# Patient Record
Sex: MALE | Race: WHITE | Employment: FULL TIME | URBAN - METROPOLITAN AREA
[De-identification: names, ages, dates, MRNs, and addresses within clinical notes are randomized per-mention and may not be internally consistent; named-entity substitution may affect disease eponyms.]

---

## 2019-01-07 PROBLEM — E05.00 GRAVES' DISEASE: Status: ACTIVE | Noted: 2019-01-07

## 2019-01-07 PROBLEM — N62 GYNECOMASTIA: Status: ACTIVE | Noted: 2019-01-07

## 2019-01-07 PROBLEM — R00.2 PALPITATIONS: Status: ACTIVE | Noted: 2019-01-07

## 2019-01-25 ENCOUNTER — HOSPITAL ENCOUNTER (INPATIENT)
Age: 44
LOS: 4 days | Discharge: HOME | DRG: 645 | End: 2019-01-29
Attending: EMERGENCY MEDICINE | Admitting: INTERNAL MEDICINE
Payer: COMMERCIAL

## 2019-01-25 ENCOUNTER — APPOINTMENT (OUTPATIENT)
Dept: GENERAL RADIOLOGY | Age: 44
DRG: 645 | End: 2019-01-25
Attending: EMERGENCY MEDICINE
Payer: COMMERCIAL

## 2019-01-25 DIAGNOSIS — E05.91 THYROTOXICOSIS WITH THYROTOXIC CRISIS, UNSPECIFIED THYROTOXICOSIS TYPE: Primary | ICD-10-CM

## 2019-01-25 PROBLEM — E05.90 THYROTOXICOSIS: Status: ACTIVE | Noted: 2019-01-25

## 2019-01-25 LAB
ALBUMIN SERPL-MCNC: 3.4 G/DL (ref 3.5–5)
ALBUMIN/GLOB SERPL: 0.9 {RATIO} (ref 1.2–3.5)
ALP SERPL-CCNC: 148 U/L (ref 50–136)
ALT SERPL-CCNC: 44 U/L (ref 12–65)
AMPHET UR QL SCN: NEGATIVE
ANION GAP SERPL CALC-SCNC: 8 MMOL/L (ref 7–16)
APPEARANCE UR: CLEAR
AST SERPL-CCNC: 20 U/L (ref 15–37)
ATRIAL RATE: 141 BPM
BACTERIA URNS QL MICRO: 0 /HPF
BARBITURATES UR QL SCN: NEGATIVE
BASOPHILS # BLD: 0 K/UL (ref 0–0.2)
BASOPHILS NFR BLD: 0 % (ref 0–2)
BENZODIAZ UR QL: NEGATIVE
BILIRUB SERPL-MCNC: 1.1 MG/DL (ref 0.2–1.1)
BILIRUB UR QL: NEGATIVE
BUN SERPL-MCNC: 16 MG/DL (ref 6–23)
CALCIUM SERPL-MCNC: 9.4 MG/DL (ref 8.3–10.4)
CALCULATED P AXIS, ECG09: 73 DEGREES
CALCULATED R AXIS, ECG10: 134 DEGREES
CALCULATED T AXIS, ECG11: 60 DEGREES
CANNABINOIDS UR QL SCN: POSITIVE
CASTS URNS QL MICRO: ABNORMAL /LPF
CHLORIDE SERPL-SCNC: 101 MMOL/L (ref 98–107)
CK MB CFR SERPL CALC: NORMAL %
CK MB SERPL-MCNC: <1 NG/ML (ref 0.5–3.6)
CK SERPL-CCNC: 37 U/L (ref 21–215)
CO2 SERPL-SCNC: 24 MMOL/L (ref 21–32)
COCAINE UR QL SCN: NEGATIVE
COLOR UR: ABNORMAL
CREAT SERPL-MCNC: 0.61 MG/DL (ref 0.8–1.5)
CRP SERPL-MCNC: 18.7 MG/DL (ref 0–0.9)
DIAGNOSIS, 93000: NORMAL
DIFFERENTIAL METHOD BLD: ABNORMAL
EOSINOPHIL # BLD: 0 K/UL (ref 0–0.8)
EOSINOPHIL NFR BLD: 0 % (ref 0.5–7.8)
EPI CELLS #/AREA URNS HPF: ABNORMAL /HPF
ERYTHROCYTE [DISTWIDTH] IN BLOOD BY AUTOMATED COUNT: 12.6 % (ref 11.9–14.6)
ERYTHROCYTE [SEDIMENTATION RATE] IN BLOOD: 39 MM/HR (ref 0–15)
GLOBULIN SER CALC-MCNC: 3.8 G/DL (ref 2.3–3.5)
GLUCOSE BLD STRIP.AUTO-MCNC: 139 MG/DL (ref 65–100)
GLUCOSE SERPL-MCNC: 106 MG/DL (ref 65–100)
GLUCOSE UR STRIP.AUTO-MCNC: NEGATIVE MG/DL
HCT VFR BLD AUTO: 35.6 % (ref 41.1–50.3)
HGB BLD-MCNC: 12.4 G/DL (ref 13.6–17.2)
HGB UR QL STRIP: NEGATIVE
IMM GRANULOCYTES # BLD AUTO: 0.1 K/UL (ref 0–0.5)
IMM GRANULOCYTES NFR BLD AUTO: 1 % (ref 0–5)
KETONES UR QL STRIP.AUTO: 40 MG/DL
LACTATE BLD-SCNC: 1.53 MMOL/L (ref 0.5–1.9)
LEUKOCYTE ESTERASE UR QL STRIP.AUTO: NEGATIVE
LYMPHOCYTES # BLD: 1.7 K/UL (ref 0.5–4.6)
LYMPHOCYTES NFR BLD: 12 % (ref 13–44)
MAGNESIUM SERPL-MCNC: 2.2 MG/DL (ref 1.8–2.4)
MCH RBC QN AUTO: 29.4 PG (ref 26.1–32.9)
MCHC RBC AUTO-ENTMCNC: 34.8 G/DL (ref 31.4–35)
MCV RBC AUTO: 84.4 FL (ref 79.6–97.8)
METHADONE UR QL: NEGATIVE
MONOCYTES # BLD: 0.6 K/UL (ref 0.1–1.3)
MONOCYTES NFR BLD: 4 % (ref 4–12)
NEUTS SEG # BLD: 11.4 K/UL (ref 1.7–8.2)
NEUTS SEG NFR BLD: 83 % (ref 43–78)
NITRITE UR QL STRIP.AUTO: NEGATIVE
NRBC # BLD: 0 K/UL (ref 0–0.2)
OPIATES UR QL: NEGATIVE
P-R INTERVAL, ECG05: 112 MS
PCP UR QL: NEGATIVE
PH UR STRIP: 5.5 [PH] (ref 5–9)
PLATELET # BLD AUTO: 222 K/UL (ref 150–450)
PMV BLD AUTO: 9.7 FL (ref 9.4–12.3)
POTASSIUM SERPL-SCNC: 3.9 MMOL/L (ref 3.5–5.1)
PROT SERPL-MCNC: 7.2 G/DL (ref 6.3–8.2)
PROT UR STRIP-MCNC: 30 MG/DL
Q-T INTERVAL, ECG07: 366 MS
QRS DURATION, ECG06: 96 MS
QTC CALCULATION (BEZET), ECG08: 560 MS
RBC # BLD AUTO: 4.22 M/UL (ref 4.23–5.6)
RBC #/AREA URNS HPF: ABNORMAL /HPF
SODIUM SERPL-SCNC: 133 MMOL/L (ref 136–145)
SP GR UR REFRACTOMETRY: 1.04 (ref 1–1.02)
T4 FREE SERPL-MCNC: 5.6 NG/DL (ref 0.9–1.8)
TROPONIN I SERPL-MCNC: <0.02 NG/ML (ref 0.02–0.05)
TSH SERPL DL<=0.005 MIU/L-ACNC: <0.005 UIU/ML (ref 0.36–3.74)
UROBILINOGEN UR QL STRIP.AUTO: 1 EU/DL (ref 0.2–1)
VENTRICULAR RATE, ECG03: 141 BPM
WBC # BLD AUTO: 13.8 K/UL (ref 4.3–11.1)
WBC URNS QL MICRO: ABNORMAL /HPF

## 2019-01-25 PROCEDURE — 87389 HIV-1 AG W/HIV-1&-2 AB AG IA: CPT

## 2019-01-25 PROCEDURE — 84439 ASSAY OF FREE THYROXINE: CPT

## 2019-01-25 PROCEDURE — 87086 URINE CULTURE/COLONY COUNT: CPT

## 2019-01-25 PROCEDURE — 80307 DRUG TEST PRSMV CHEM ANLYZR: CPT

## 2019-01-25 PROCEDURE — 85652 RBC SED RATE AUTOMATED: CPT

## 2019-01-25 PROCEDURE — 77030032490 HC SLV COMPR SCD KNE COVD -B

## 2019-01-25 PROCEDURE — 74011250637 HC RX REV CODE- 250/637: Performed by: EMERGENCY MEDICINE

## 2019-01-25 PROCEDURE — 99285 EMERGENCY DEPT VISIT HI MDM: CPT | Performed by: EMERGENCY MEDICINE

## 2019-01-25 PROCEDURE — 83605 ASSAY OF LACTIC ACID: CPT

## 2019-01-25 PROCEDURE — 74011000250 HC RX REV CODE- 250: Performed by: INTERNAL MEDICINE

## 2019-01-25 PROCEDURE — 82550 ASSAY OF CK (CPK): CPT

## 2019-01-25 PROCEDURE — 74011250636 HC RX REV CODE- 250/636: Performed by: INTERNAL MEDICINE

## 2019-01-25 PROCEDURE — 83735 ASSAY OF MAGNESIUM: CPT

## 2019-01-25 PROCEDURE — 74011000258 HC RX REV CODE- 258: Performed by: INTERNAL MEDICINE

## 2019-01-25 PROCEDURE — 85025 COMPLETE CBC W/AUTO DIFF WBC: CPT

## 2019-01-25 PROCEDURE — 86140 C-REACTIVE PROTEIN: CPT

## 2019-01-25 PROCEDURE — 84443 ASSAY THYROID STIM HORMONE: CPT

## 2019-01-25 PROCEDURE — 96365 THER/PROPH/DIAG IV INF INIT: CPT | Performed by: EMERGENCY MEDICINE

## 2019-01-25 PROCEDURE — 74011250637 HC RX REV CODE- 250/637: Performed by: INTERNAL MEDICINE

## 2019-01-25 PROCEDURE — 80053 COMPREHEN METABOLIC PANEL: CPT

## 2019-01-25 PROCEDURE — 87040 BLOOD CULTURE FOR BACTERIA: CPT

## 2019-01-25 PROCEDURE — 93306 TTE W/DOPPLER COMPLETE: CPT | Performed by: INTERNAL MEDICINE

## 2019-01-25 PROCEDURE — 93005 ELECTROCARDIOGRAM TRACING: CPT

## 2019-01-25 PROCEDURE — 74011250636 HC RX REV CODE- 250/636: Performed by: EMERGENCY MEDICINE

## 2019-01-25 PROCEDURE — 74011000258 HC RX REV CODE- 258: Performed by: EMERGENCY MEDICINE

## 2019-01-25 PROCEDURE — 96375 TX/PRO/DX INJ NEW DRUG ADDON: CPT | Performed by: EMERGENCY MEDICINE

## 2019-01-25 PROCEDURE — 65660000000 HC RM CCU STEPDOWN

## 2019-01-25 PROCEDURE — 84484 ASSAY OF TROPONIN QUANT: CPT

## 2019-01-25 PROCEDURE — 82962 GLUCOSE BLOOD TEST: CPT

## 2019-01-25 PROCEDURE — 84481 FREE ASSAY (FT-3): CPT

## 2019-01-25 PROCEDURE — 81001 URINALYSIS AUTO W/SCOPE: CPT

## 2019-01-25 PROCEDURE — 71046 X-RAY EXAM CHEST 2 VIEWS: CPT

## 2019-01-25 PROCEDURE — 74011000250 HC RX REV CODE- 250: Performed by: EMERGENCY MEDICINE

## 2019-01-25 RX ORDER — HYDROCORTISONE SODIUM SUCCINATE 100 MG/2ML
INJECTION, POWDER, FOR SOLUTION INTRAMUSCULAR; INTRAVENOUS
Status: ACTIVE
Start: 2019-01-25 | End: 2019-01-26

## 2019-01-25 RX ORDER — METOPROLOL TARTRATE 5 MG/5ML
5 INJECTION INTRAVENOUS ONCE
Status: COMPLETED | OUTPATIENT
Start: 2019-01-25 | End: 2019-01-25

## 2019-01-25 RX ORDER — PROPYLTHIOURACIL 50 MG/1
200 TABLET ORAL ONCE
Status: COMPLETED | OUTPATIENT
Start: 2019-01-25 | End: 2019-01-25

## 2019-01-25 RX ORDER — DIPHENHYDRAMINE HCL 25 MG
25 CAPSULE ORAL
Status: COMPLETED | OUTPATIENT
Start: 2019-01-25 | End: 2019-01-25

## 2019-01-25 RX ORDER — HYDROCORTISONE SODIUM SUCCINATE 100 MG/2ML
100 INJECTION, POWDER, FOR SOLUTION INTRAMUSCULAR; INTRAVENOUS EVERY 8 HOURS
Status: DISCONTINUED | OUTPATIENT
Start: 2019-01-25 | End: 2019-01-28

## 2019-01-25 RX ORDER — ACETAMINOPHEN 325 MG/1
650 TABLET ORAL
Status: DISCONTINUED | OUTPATIENT
Start: 2019-01-25 | End: 2019-01-29 | Stop reason: HOSPADM

## 2019-01-25 RX ORDER — ONDANSETRON 2 MG/ML
4 INJECTION INTRAMUSCULAR; INTRAVENOUS
Status: DISCONTINUED | OUTPATIENT
Start: 2019-01-25 | End: 2019-01-29 | Stop reason: HOSPADM

## 2019-01-25 RX ORDER — HYDROCORTISONE SODIUM SUCCINATE 100 MG/2ML
100 INJECTION, POWDER, FOR SOLUTION INTRAMUSCULAR; INTRAVENOUS ONCE
Status: COMPLETED | OUTPATIENT
Start: 2019-01-25 | End: 2019-01-25

## 2019-01-25 RX ORDER — PROPYLTHIOURACIL 50 MG/1
100 TABLET ORAL EVERY 8 HOURS
Status: DISCONTINUED | OUTPATIENT
Start: 2019-01-25 | End: 2019-01-29 | Stop reason: HOSPADM

## 2019-01-25 RX ORDER — VANCOMYCIN HYDROCHLORIDE
1250 ONCE
Status: COMPLETED | OUTPATIENT
Start: 2019-01-25 | End: 2019-01-25

## 2019-01-25 RX ORDER — SODIUM CHLORIDE 0.9 % (FLUSH) 0.9 %
5-40 SYRINGE (ML) INJECTION EVERY 8 HOURS
Status: DISCONTINUED | OUTPATIENT
Start: 2019-01-25 | End: 2019-01-29 | Stop reason: HOSPADM

## 2019-01-25 RX ORDER — SODIUM CHLORIDE 9 MG/ML
150 INJECTION, SOLUTION INTRAVENOUS CONTINUOUS
Status: DISCONTINUED | OUTPATIENT
Start: 2019-01-25 | End: 2019-01-26

## 2019-01-25 RX ORDER — SODIUM CHLORIDE 0.9 % (FLUSH) 0.9 %
5-40 SYRINGE (ML) INJECTION AS NEEDED
Status: DISCONTINUED | OUTPATIENT
Start: 2019-01-25 | End: 2019-01-29 | Stop reason: HOSPADM

## 2019-01-25 RX ORDER — METOPROLOL TARTRATE 25 MG/1
25 TABLET, FILM COATED ORAL EVERY 6 HOURS
Status: DISCONTINUED | OUTPATIENT
Start: 2019-01-25 | End: 2019-01-26

## 2019-01-25 RX ORDER — DIPHENHYDRAMINE HCL 25 MG
25 CAPSULE ORAL EVERY 6 HOURS
Status: DISCONTINUED | OUTPATIENT
Start: 2019-01-25 | End: 2019-01-29 | Stop reason: HOSPADM

## 2019-01-25 RX ADMIN — DIPHENHYDRAMINE HYDROCHLORIDE 25 MG: 25 CAPSULE ORAL at 20:13

## 2019-01-25 RX ADMIN — METOPROLOL TARTRATE 5 MG: 1 INJECTION, SOLUTION INTRAVENOUS at 13:50

## 2019-01-25 RX ADMIN — HYDROCORTISONE SODIUM SUCCINATE 100 MG: 100 INJECTION, POWDER, FOR SOLUTION INTRAMUSCULAR; INTRAVENOUS at 20:12

## 2019-01-25 RX ADMIN — FAMOTIDINE 20 MG: 10 INJECTION, SOLUTION INTRAVENOUS at 20:13

## 2019-01-25 RX ADMIN — SODIUM CHLORIDE 1000 ML: 900 INJECTION, SOLUTION INTRAVENOUS at 13:50

## 2019-01-25 RX ADMIN — METOPROLOL TARTRATE 25 MG: 25 TABLET ORAL at 17:53

## 2019-01-25 RX ADMIN — VANCOMYCIN HYDROCHLORIDE 1250 MG: 10 INJECTION, POWDER, LYOPHILIZED, FOR SOLUTION INTRAVENOUS at 14:41

## 2019-01-25 RX ADMIN — PROPYLTHIOURACIL 200 MG: 50 TABLET ORAL at 13:49

## 2019-01-25 RX ADMIN — AZTREONAM 2 G: 2 INJECTION, POWDER, LYOPHILIZED, FOR SOLUTION INTRAMUSCULAR; INTRAVENOUS at 13:50

## 2019-01-25 RX ADMIN — Medication 10 ML: at 20:14

## 2019-01-25 RX ADMIN — AZTREONAM 2 G: 2 INJECTION, POWDER, LYOPHILIZED, FOR SOLUTION INTRAMUSCULAR; INTRAVENOUS at 21:48

## 2019-01-25 RX ADMIN — PROPYLTHIOURACIL 100 MG: 50 TABLET ORAL at 20:13

## 2019-01-25 RX ADMIN — ACETAMINOPHEN 650 MG: 325 TABLET, FILM COATED ORAL at 15:54

## 2019-01-25 RX ADMIN — DIPHENHYDRAMINE HYDROCHLORIDE 25 MG: 25 CAPSULE ORAL at 13:49

## 2019-01-25 RX ADMIN — HYDROCORTISONE SODIUM SUCCINATE 100 MG: 100 INJECTION, POWDER, FOR SOLUTION INTRAMUSCULAR; INTRAVENOUS at 13:49

## 2019-01-25 RX ADMIN — Medication 5 ML: at 17:35

## 2019-01-25 RX ADMIN — SODIUM CHLORIDE 150 ML/HR: 900 INJECTION, SOLUTION INTRAVENOUS at 16:43

## 2019-01-25 NOTE — ED TRIAGE NOTES
Pt to ed via EMS from [address] [place] [address] for possible pericarditis and flu like s/sx. Afebrile. Tachy at 120. 18g LAC.

## 2019-01-25 NOTE — PROGRESS NOTES
Problem: Falls - Risk of 
Goal: *Absence of Falls Document Yohana Hart Fall Risk and appropriate interventions in the flowsheet. Outcome: Progressing Towards Goal 
Fall Risk Interventions: 
  
 
  
 
Medication Interventions: Evaluate medications/consider consulting pharmacy, Patient to call before getting OOB Elimination Interventions: Call light in reach, Patient to call for help with toileting needs, Urinal in reach

## 2019-01-25 NOTE — ED TRIAGE NOTES
Pt sts that he has a recent diagnosis of Graves Disease and has been started on medications for about 1 month.

## 2019-01-25 NOTE — ED NOTES
TRANSFER - OUT REPORT: 
 
Verbal report given to Rufino Rodriguez RN on Archana Smiling  being transferred to 3rd floor [facility] for routine progression of care Report consisted of patients Situation, Background, Assessment and  
Recommendations(SBAR). Information from the following report(s) ED Summary was reviewed with the receiving nurse. Lines:  
Peripheral IV 01/25/19 Right Hand (Active) Site Assessment Clean, dry, & intact 1/25/2019 12:49 PM  
Phlebitis Assessment 0 1/25/2019 12:49 PM  
Infiltration Assessment 0 1/25/2019 12:49 PM  
Dressing Status Clean, dry, & intact 1/25/2019 12:49 PM  
   
Peripheral IV 01/25/19 Left Antecubital (Active) Site Assessment Clean, dry, & intact 1/25/2019 12:49 PM  
Phlebitis Assessment 0 1/25/2019 12:49 PM  
Infiltration Assessment 0 1/25/2019 12:49 PM  
Dressing Status Clean, dry, & intact 1/25/2019 12:49 PM  
  
 
Opportunity for questions and clarification was provided. Patient transported with: 
 Patient-specific medications from Pharmacy

## 2019-01-25 NOTE — H&P
Hospitalist H&P Note Admit Date:  2019 12:16 PM  
Name:  Eri Carreon Age:  41 y.o. 
:  1975 MRN:  [id number] PCP:  Georgiana Opitz, MD 
Treatment Team: Attending Provider: Juno Rao MD; Primary Nurse: Shiloh Warner RN 
 
HPI:  
 
Pt is a 40 yo male recently diagnosed with Graves disease and started on Tapazole and Lopressor about 3 weeks ago. He reports compliance with these meds with mild improvement of symptoms of weight loss, tremors. About 2 days ago pt had sudden flu like symptoms, new full body rash, Tmax 99s, diarrhea, fine tremors extremities, palpitations. In ER pt is noted to meet sepsis criteria with WBC 13, HR 140s, TMax 101.4. Uncertain infection but per sepsis protocol pt started on abx. Additionally started on Solucortef and PTU for possible thyroid dara. Case discussed with Brannon Rao pt's endocrinologist, who feels less likely to be thyroid storm (in light of TSH and free T4 actually improving) and more likely to be reactive from possibly Tapazole or virus. Pt denies CP or SOB but looks quite uncomfortable with palpitations, fine tremors, rash. 10 systems reviewed and negative except as noted in HPI. Past Medical History:  
Diagnosis Date  Graves disease Past Surgical History:  
Procedure Laterality Date  HX WISDOM TEETH EXTRACTION Allergies Allergen Reactions  Penicillins Hives Social History Tobacco Use  Smoking status: Never Smoker Substance Use Topics  Alcohol use: No  
  Frequency: Never Family History Problem Relation Age of Onset  Thyroid Disease Mother Hypothyroidism secondary to Hashimoto's thyroiditis  Other Father   
     tumor in throat and intestines  Thyroid Disease Maternal Grandmother  Hypertension Maternal Grandmother  Lung Cancer Paternal Grandfather   
     smoker There is no immunization history on file for this patient. PTA Medications: Prior to Admission Medications Prescriptions Last Dose Informant Patient Reported? Taking? methIMAzole (TAPAZOLE) 10 mg tablet   No No  
Sig: Take 3 Tabs by mouth daily. metoprolol tartrate (LOPRESSOR) 50 mg tablet   No No  
Sig: Take 1 Tab by mouth two (2) times a day. multivitamin (ONE A DAY) tablet   Yes No  
Sig: Take 1 Tab by mouth daily. Facility-Administered Medications: None Objective:  
 
Patient Vitals for the past 24 hrs: 
 Temp Pulse Resp BP SpO2  
01/25/19 1350  (!) 134  129/71   
01/25/19 1250     98 % 01/25/19 1151 98.5 °F (36.9 °C) (!) 152 18 112/77 96 % Oxygen Therapy O2 Sat (%): 98 % (01/25/19 1250) Pulse via Oximetry: 118 beats per minute (01/25/19 1250) O2 Device: Room air (01/25/19 1250) No intake or output data in the 24 hours ending 01/25/19 1417 Physical Exam: 
General:    Thin,  Alert. Anxious Eyes:   Normal sclera. Extraocular movements intact. ENT:  Normocephalic, atraumatic. Moist mucous membranes CV:   RRR. No m/r/g. Peripheral pulses 2+. Capillary refill <2s. Lungs:  CTAB. No wheezing, rhonchi, or rales. Abdomen: Soft, nontender, nondistended. Extremities: Warm and dry. No cyanosis or edema. Neurologic: CN II-XII grossly intact. Sensation intact. Fine tremors UEs Skin:      Normal coloration. Flat rash Psych:  Normal mood and affect. I reviewed the labs, imaging, EKGs, telemetry, and other studies done this admission. Data Review:  
Recent Results (from the past 24 hour(s)) EKG, 12 LEAD, INITIAL Collection Time: 01/25/19 11:48 AM  
Result Value Ref Range Ventricular Rate 141 BPM  
 Atrial Rate 141 BPM  
 P-R Interval 112 ms QRS Duration 96 ms  
 Q-T Interval 366 ms  
 QTC Calculation (Bezet) 560 ms Calculated P Axis 73 degrees Calculated R Axis 134 degrees Calculated T Axis 60 degrees Diagnosis Sinus tachycardia Incomplete right bundle branch block Possible Anterolateral infarct , age undetermined Abnormal ECG No previous ECGs available C REACTIVE PROTEIN, QT Collection Time: 01/25/19 11:57 AM  
Result Value Ref Range C-Reactive protein 18.7 (H) 0.0 - 0.9 mg/dL CBC WITH AUTOMATED DIFF Collection Time: 01/25/19 11:57 AM  
Result Value Ref Range WBC 13.8 (H) 4.3 - 11.1 K/uL  
 RBC 4.22 (L) 4.23 - 5.6 M/uL  
 HGB 12.4 (L) 13.6 - 17.2 g/dL HCT 35.6 (L) 41.1 - 50.3 % MCV 84.4 79.6 - 97.8 FL  
 MCH 29.4 26.1 - 32.9 PG  
 MCHC 34.8 31.4 - 35.0 g/dL  
 RDW 12.6 11.9 - 14.6 % PLATELET 738 512 - 680 K/uL MPV 9.7 9.4 - 12.3 FL ABSOLUTE NRBC 0.00 0.0 - 0.2 K/uL  
 DF AUTOMATED NEUTROPHILS 83 (H) 43 - 78 % LYMPHOCYTES 12 (L) 13 - 44 % MONOCYTES 4 4.0 - 12.0 % EOSINOPHILS 0 (L) 0.5 - 7.8 % BASOPHILS 0 0.0 - 2.0 % IMMATURE GRANULOCYTES 1 0.0 - 5.0 %  
 ABS. NEUTROPHILS 11.4 (H) 1.7 - 8.2 K/UL  
 ABS. LYMPHOCYTES 1.7 0.5 - 4.6 K/UL  
 ABS. MONOCYTES 0.6 0.1 - 1.3 K/UL  
 ABS. EOSINOPHILS 0.0 0.0 - 0.8 K/UL  
 ABS. BASOPHILS 0.0 0.0 - 0.2 K/UL  
 ABS. IMM. GRANS. 0.1 0.0 - 0.5 K/UL METABOLIC PANEL, COMPREHENSIVE Collection Time: 01/25/19 11:57 AM  
Result Value Ref Range Sodium 133 (L) 136 - 145 mmol/L Potassium 3.9 3.5 - 5.1 mmol/L Chloride 101 98 - 107 mmol/L  
 CO2 24 21 - 32 mmol/L Anion gap 8 7 - 16 mmol/L Glucose 106 (H) 65 - 100 mg/dL BUN 16 6 - 23 MG/DL Creatinine 0.61 (L) 0.8 - 1.5 MG/DL  
 GFR est AA >60 >60 ml/min/1.73m2 GFR est non-AA >60 >60 ml/min/1.73m2 Calcium 9.4 8.3 - 10.4 MG/DL Bilirubin, total 1.1 0.2 - 1.1 MG/DL  
 ALT (SGPT) 44 12 - 65 U/L  
 AST (SGOT) 20 15 - 37 U/L Alk. phosphatase 148 (H) 50 - 136 U/L Protein, total 7.2 6.3 - 8.2 g/dL Albumin 3.4 (L) 3.5 - 5.0 g/dL Globulin 3.8 (H) 2.3 - 3.5 g/dL A-G Ratio 0.9 (L) 1.2 - 3.5    
TROPONIN I Collection Time: 01/25/19 11:57 AM  
Result Value Ref Range  Troponin-I, Qt. <0.02 (L) 0.02 - 0.05 NG/ML  
 CK WITH MB  
 Collection Time: 01/25/19 11:57 AM  
Result Value Ref Range CK 37 21 - 215 U/L  
 CK - MB <1.0 0.5 - 3.6 ng/ml CK-MB Index Cannot be calculated <2.5 % SED RATE, AUTOMATED Collection Time: 01/25/19 11:57 AM  
Result Value Ref Range Sed rate, automated 39 (H) 0 - 15 mm/hr MAGNESIUM Collection Time: 01/25/19 11:57 AM  
Result Value Ref Range Magnesium 2.2 1.8 - 2.4 mg/dL TSH 3RD GENERATION Collection Time: 01/25/19 11:57 AM  
Result Value Ref Range TSH <0.005 (L) 0.358 - 3.740 uIU/mL T4, FREE Collection Time: 01/25/19 11:57 AM  
Result Value Ref Range T4, Free 5.6 (H) 0.9 - 1.8 NG/DL  
POC LACTIC ACID Collection Time: 01/25/19 12:50 PM  
Result Value Ref Range Lactic Acid (POC) 1.53 0.5 - 1.9 mmol/L All Micro Results Procedure Component Value Units Date/Time CULTURE, BLOOD [[id number]] Collected:  01/25/19 1242 Order Status:  Completed Specimen:  Blood Updated:  01/25/19 1326 CULTURE, BLOOD [[id number]] Collected:  01/25/19 1242 Order Status:  Completed Specimen:  Whole Blood Updated:  01/25/19 1326 CULTURE, URINE [[id number]] Order Status:  Sent Specimen:  Urine from Clean catch Current Facility-Administered Medications Medication Dose Route Frequency  sodium chloride 0.9 % bolus infusion 1,000 mL  1,000 mL IntraVENous ONCE  
 vancomycin (VANCOCIN) 1250 mg in  ml infusion  1,250 mg IntraVENous ONCE  
 aztreonam (AZACTAM) 2 g in 0.9% sodium chloride (MBP/ADV) 100 mL  2 g IntraVENous ONCE  hydrocortisone Sod Succ (PF) (SOLU-CORTEF) 100 mg/2 mL injection Current Outpatient Medications Medication Sig  
 multivitamin (ONE A DAY) tablet Take 1 Tab by mouth daily.  metoprolol tartrate (LOPRESSOR) 50 mg tablet Take 1 Tab by mouth two (2) times a day.  methIMAzole (TAPAZOLE) 10 mg tablet Take 3 Tabs by mouth daily. Other Studies: Xr Chest Pa Lat Result Date: 1/25/2019 Frontal and lateral views of the chest Comparison: none Indication: chest pain FINDINGS: The cardiac and mediastinal contours are within normal limits. There is no focal pulmonary infiltrate, nodule, effusion, or pneumothorax. No discrete acute osseous lesion seen. IMPRESSION: No acute cardiopulmonary process. Assessment and Plan:  
 
Hospital Problems as of 1/25/2019 Date Reviewed: 1/7/2019 Codes Class Noted - Resolved POA Thyrotoxicosis ICD-10-CM: E05.90 ICD-9-CM: 242.90  1/25/2019 - Present Unknown A/P: 
 
Thyrotoxicosis With improving T4, TSH Endocrine does not feel as thyroid storm, more likely reactive Tele monitoring for S Tach PO Lopressor q 6 hrs for goal HR < 100, Hold for HR < 80 Stop Tapazole, start  mg TID Solucortef 100 mg q 8 hrs PRN Benadryl for rash/itch Obtain echo Sepsis No obvious source. Cont Azactam, Vanc. Follow cultures DC planning [facility] in 2-3 days No obvious SW needs DVT ppx: SCDs Code status:  Full Estimated LOS:  Greater than 2 midnights Risk:  high Signed: 
Jose Stands, NP

## 2019-01-25 NOTE — ED PROVIDER NOTES
70-year-old male presents with flulike symptoms for the past 2 days. He reports heart racing and tremors with intermittent hives. Also admits using a new soap. He has low-grade fevers around 99. He denies any cough, sore throat, chest pain, shortness of breath, nausea or vomiting. He had some diarrhea today. He had a near-syncopal episode 2 days ago and going to the bathroom. He was diagnosed with Graves' disease last month and started on methimazole and lopressor. He states tremors had improved after starting those medications. He was seen at urgent care today and was sent to the emergency department for possible pericarditis. An elevated white blood cell count with tachycardia and some diffuse ST changes. Patient denies any burning with urination or other infectious symptoms. No known ill contacts. The history is provided by the patient. Rash Palpitations Associated symptoms include a fever and back pain. Pertinent negatives include no chest pain, no abdominal pain, no nausea, no vomiting, no headaches, no weakness, no cough and no shortness of breath. Past Medical History:  
Diagnosis Date  Graves disease Past Surgical History:  
Procedure Laterality Date  HX WISDOM TEETH EXTRACTION Family History:  
Problem Relation Age of Onset  Thyroid Disease Mother Hypothyroidism secondary to Hashimoto's thyroiditis  Other Father   
     tumor in throat and intestines  Thyroid Disease Maternal Grandmother  Hypertension Maternal Grandmother  Lung Cancer Paternal Grandfather   
     smoker Social History Socioeconomic History  Marital status: SINGLE Spouse name: Not on file  Number of children: Not on file  Years of education: Not on file  Highest education level: Not on file Social Needs  Financial resource strain: Not on file  Food insecurity - worry: Not on file  Food insecurity - inability: Not on file  Transportation needs - medical: Not on file  Transportation needs - non-medical: Not on file Occupational History  Not on file Tobacco Use  Smoking status: Never Smoker Substance and Sexual Activity  Alcohol use: No  
  Frequency: Never  Drug use: No  
 Sexual activity: Not on file Other Topics Concern  Not on file Social History Narrative  Not on file ALLERGIES: Penicillins Review of Systems Constitutional: Positive for fatigue and fever. Negative for chills. HENT: Negative for hearing loss. Eyes: Negative for visual disturbance. Respiratory: Negative for cough and shortness of breath. Cardiovascular: Positive for palpitations. Negative for chest pain. Gastrointestinal: Positive for diarrhea. Negative for abdominal pain, nausea and vomiting. Genitourinary: Negative for dysuria. Musculoskeletal: Positive for arthralgias, back pain and myalgias. Skin: Positive for rash. Neurological: Positive for tremors and light-headedness. Negative for weakness and headaches. Psychiatric/Behavioral: Negative for confusion. Vitals:  
 01/25/19 1151 01/25/19 1250 BP: 112/77 Pulse: (!) 152 Resp: 18 Temp: 98.5 °F (36.9 °C) SpO2: 96% 98% Weight: 59 kg (130 lb) Height: 5' 9\" (1.753 m) Physical Exam  
Constitutional: He appears well-developed and well-nourished. HENT:  
Head: Normocephalic and atraumatic. Right Ear: External ear normal.  
Left Ear: External ear normal.  
Nose: Nose normal.  
Mouth/Throat: Oropharynx is clear and moist.  
Eyes: Conjunctivae are normal. Pupils are equal, round, and reactive to light. Neck: Normal range of motion. Neck supple. Cardiovascular: Regular rhythm, normal heart sounds and intact distal pulses. Tachycardia present. Pulmonary/Chest: Effort normal and breath sounds normal. No respiratory distress. He has no wheezes. Abdominal: Soft. Bowel sounds are normal. He exhibits no distension. There is no tenderness. Musculoskeletal: Normal range of motion. He exhibits no edema. Neurological: He is alert. He has normal strength. He is not disoriented. He displays tremor. No cranial nerve deficit or sensory deficit. Skin: Skin is warm and dry. Rash noted. Rash is urticarial.  
 
  
Psychiatric: He has a normal mood and affect. Judgment normal.  
Nursing note and vitals reviewed. MDM Number of Diagnoses or Management Options Diagnosis management comments: Parts of this document were created using dragon voice recognition software. The chart has been reviewed but errors may still be present. 1:50 PM 
Concern for thyrotoxicosis, early thyroid storm, or infectious etiology. Low suspicion for pericarditis as there is no significant ST elevation on EKG. There is incomplete right bundle branch block. Chest x-ray clear. Bedside echocardiogram shows hyperdynamic heart. Treated with antibiotics possible sepsis. Given beta blocker, PTU, and hydrocortisone for possible thyroid storm. Benadryl ordered for urticaria of unclear etiology. Symptoms could be adverse effect of methimazole. Discussed with hospitalist for admission and continued evaluation. Flu was negative at urgent care so not repeated. Amount and/or Complexity of Data Reviewed Clinical lab tests: ordered and reviewed (Results for orders placed or performed during the hospital encounter of 01/25/19 
-C REACTIVE PROTEIN, QT Result                      Value             Ref Range C-Reactive protein          18.7 (H)          0.0 - 0.9 mg* 
-CBC WITH AUTOMATED DIFF Result                      Value             Ref Range WBC                         13.8 (H)          4.3 - 11.1 K* 
     RBC                         4.22 (L)          4.23 - 5.6 M* HGB                         12.4 (L)          13.6 - 17.2 * HCT                         35.6 (L)          41.1 - 50.3 % MCV                         84.4              79.6 - 97.8 * MCH                         29.4              26.1 - 32.9 * MCHC                        34.8              31.4 - 35.0 * RDW                         12.6              11.9 - 14.6 % PLATELET                    222               150 - 450 K/* MPV                         9.7               9.4 - 12.3 FL ABSOLUTE NRBC               0.00              0.0 - 0.2 K/* DF                          AUTOMATED NEUTROPHILS                 83 (H)            43 - 78 % LYMPHOCYTES                 12 (L)            13 - 44 % MONOCYTES                   4                 4.0 - 12.0 % EOSINOPHILS                 0 (L)             0.5 - 7.8 % BASOPHILS                   0                 0.0 - 2.0 % IMMATURE GRANULOCYTES       1                 0.0 - 5.0 %   
     ABS. NEUTROPHILS            11.4 (H)          1.7 - 8.2 K/* ABS. LYMPHOCYTES            1.7               0.5 - 4.6 K/* ABS. MONOCYTES              0.6               0.1 - 1.3 K/* ABS. EOSINOPHILS            0.0               0.0 - 0.8 K/* ABS. BASOPHILS              0.0               0.0 - 0.2 K/* ABS. IMM. GRANS.            0.1               0.0 - 0.5 K/* 
-METABOLIC PANEL, COMPREHENSIVE Result                      Value             Ref Range Sodium                      133 (L)           136 - 145 mm* Potassium                   3.9               3.5 - 5.1 mm* Chloride                    101               98 - 107 mmo* CO2                         24                21 - 32 mmol* Anion gap                   8                 7 - 16 mmol/L Glucose                     106 (H)           65 - 100 mg/*      BUN                         16                6 - 23 MG/DL  
 Creatinine                  0.61 (L)          0.8 - 1.5 MG* 
     GFR est AA                  >60               >60 ml/min/1* GFR est non-AA              >60               >60 ml/min/1* Calcium                     9.4               8.3 - 10.4 M* Bilirubin, total            1.1               0.2 - 1.1 MG* ALT (SGPT)                  44                12 - 65 U/L   
     AST (SGOT)                  20                15 - 37 U/L Alk. phosphatase            148 (H)           50 - 136 U/L Protein, total              7.2               6.3 - 8.2 g/* Albumin                     3.4 (L)           3.5 - 5.0 g/* Globulin                    3.8 (H)           2.3 - 3.5 g/* A-G Ratio                   0.9 (L)           1.2 - 3.5     
-TROPONIN I Result                      Value             Ref Range Troponin-I, Qt.             <0.02 (L)         0.02 - 0.05 * 
-CK WITH MB Result                      Value             Ref Range CK                          37                21 - 215 U/L  
     CK - MB                     <1.0              0.5 - 3.6 ng* CK-MB Index                                   <2.5 % Cannot be calculated 
-SED RATE, AUTOMATED Result                      Value             Ref Range Sed rate, automated         39 (H)            0 - 15 mm/hr  
-MAGNESIUM Result                      Value             Ref Range Magnesium                   2.2               1.8 - 2.4 mg* 
-TSH 3RD GENERATION Result                      Value             Ref Range TSH                         <0.005 (L)        0.358 - 3.74* 
-T4, FREE Result                      Value             Ref Range T4, Free                    5.6 (H)           0.9 - 1.8 NG* 
-POC LACTIC ACID Result                      Value             Ref Range Lactic Acid (POC)           1.53              0.5 - 1.9 mm* 
-EKG, 12 LEAD, INITIAL Result                      Value             Ref Range Ventricular Rate            141               BPM           
     Atrial Rate                 141               BPM           
     P-R Interval                112               ms            
     QRS Duration                96                ms Q-T Interval                366               ms            
     QTC Calculation (Bezet)     560               ms            
     Calculated P Axis           73                degrees Calculated R Axis           134               degrees Calculated T Axis           60                degrees Diagnosis Sinus tachycardia Incomplete right bundle branch block Possible Anterolateral infarct , age undetermined Abnormal ECG No previous ECGs available ) Tests in the radiology section of CPT®: ordered and reviewed (Xr Chest Pa Lat Result Date: 1/25/2019 Frontal and lateral views of the chest Comparison: none Indication: chest pain FINDINGS: The cardiac and mediastinal contours are within normal limits. There is no focal pulmonary infiltrate, nodule, effusion, or pneumothorax. No discrete acute osseous lesion seen. IMPRESSION: No acute cardiopulmonary process. ) Tests in the medicine section of CPT®: reviewed and ordered Procedures

## 2019-01-25 NOTE — ED NOTES
Called and spoke with Dr. Marva Farr. Pt has temperature of 101.4 oral and HR of 134. Pt complains of feeling flushed. Dr. Marva Farr will put in order for tylenol.

## 2019-01-25 NOTE — PROGRESS NOTES
Bedside and Verbal shift change report given to SMITA Delgado (oncoming nurse) by self (offgoing nurse). Report included the following information SBAR, Kardex, Intake/Output, MAR, Recent Results and Med Rec Status.

## 2019-01-25 NOTE — PROGRESS NOTES
MEWS score noted to be 4. Charge nurse, Daryle Mons, RN informed and assessed patient.  informed and will assess patient. High MEWS score noted due to elevated HR. Will continue to monitor.

## 2019-01-25 NOTE — PROGRESS NOTES
Pharmacokinetic Consult to Pharmacist 
 
Eri Carreon is a 41 y.o. male being treated with vancomcyin. Height: 5' 9\" (175.3 cm)  Weight: 59 kg (130 lb) Lab Results Component Value Date/Time BUN 16 01/25/2019 11:57 AM  
 Creatinine 0.61 (L) 01/25/2019 11:57 AM  
 WBC 13.8 (H) 01/25/2019 11:57 AM  
 Lactic Acid (POC) 1.53 01/25/2019 12:50 PM  
  
Estimated Creatinine Clearance: 133 mL/min (A) (based on SCr of 0.61 mg/dL (L)). CULTURES: 
pending Day 1 of vancomycin. Goal trough is 15-20. Vancomycin dose initiated at 1250 mg x 1, then 1000 mg q 12 hours. b. Will continue to follow patient. Thank you, Mitch Polk, PharmD Clinical Pharmacist 
[phone]

## 2019-01-25 NOTE — PROGRESS NOTES
TRANSFER - IN REPORT: 
 
Verbal report received from SMITA Matamoros on Alanna Service being received from ER for routine progression of care Report consisted of patients Situation, Background, Assessment and Recommendations(SBAR). Information from the following report(s) SBAR, Kardex and Recent Results was reviewed with the receiving nurse. Opportunity for questions and clarification was provided. Assessment completed upon patients arrival to unit and care assumed. Patient arrived to room via stretcher. IV fluids restarted and telemetry monitor applied. Patient educated to call for assistance when getting out of bed. Patient voiced understanding. Admission and skin assessment completed upon arrival to floor. Bed low and locked. Call light within reach. Side railsX2. Grippers socks. Family at bedside. Skin assessment completed. Sacrum red but blanchable. Scattered rash noted. Heels are intact. No other skin issues noted at this time.

## 2019-01-26 LAB
ANION GAP SERPL CALC-SCNC: 8 MMOL/L (ref 7–16)
BUN SERPL-MCNC: 14 MG/DL (ref 6–23)
CALCIUM SERPL-MCNC: 8.3 MG/DL (ref 8.3–10.4)
CHLORIDE SERPL-SCNC: 109 MMOL/L (ref 98–107)
CO2 SERPL-SCNC: 23 MMOL/L (ref 21–32)
CREAT SERPL-MCNC: 0.41 MG/DL (ref 0.8–1.5)
ERYTHROCYTE [DISTWIDTH] IN BLOOD BY AUTOMATED COUNT: 13 % (ref 11.9–14.6)
GLUCOSE SERPL-MCNC: 109 MG/DL (ref 65–100)
HCT VFR BLD AUTO: 26.6 % (ref 41.1–50.3)
HGB BLD-MCNC: 9.1 G/DL (ref 13.6–17.2)
HIV 1+2 AB+HIV1 P24 AG SERPL QL IA: NON REACTIVE
MCH RBC QN AUTO: 29.3 PG (ref 26.1–32.9)
MCHC RBC AUTO-ENTMCNC: 34.2 G/DL (ref 31.4–35)
MCV RBC AUTO: 85.5 FL (ref 79.6–97.8)
NRBC # BLD: 0 K/UL (ref 0–0.2)
PLATELET # BLD AUTO: 168 K/UL (ref 150–450)
PMV BLD AUTO: 10.4 FL (ref 9.4–12.3)
POTASSIUM SERPL-SCNC: 3.7 MMOL/L (ref 3.5–5.1)
RBC # BLD AUTO: 3.11 M/UL (ref 4.23–5.6)
SODIUM SERPL-SCNC: 140 MMOL/L (ref 136–145)
T3FREE SERPL-MCNC: 22.4 PG/ML (ref 2–4.4)
WBC # BLD AUTO: 7.7 K/UL (ref 4.3–11.1)

## 2019-01-26 PROCEDURE — 80048 BASIC METABOLIC PNL TOTAL CA: CPT

## 2019-01-26 PROCEDURE — 77030020263 HC SOL INJ SOD CL0.9% LFCR 1000ML

## 2019-01-26 PROCEDURE — 74011250637 HC RX REV CODE- 250/637: Performed by: INTERNAL MEDICINE

## 2019-01-26 PROCEDURE — 93306 TTE W/DOPPLER COMPLETE: CPT

## 2019-01-26 PROCEDURE — 74011250636 HC RX REV CODE- 250/636: Performed by: INTERNAL MEDICINE

## 2019-01-26 PROCEDURE — 36415 COLL VENOUS BLD VENIPUNCTURE: CPT

## 2019-01-26 PROCEDURE — 74011000258 HC RX REV CODE- 258: Performed by: INTERNAL MEDICINE

## 2019-01-26 PROCEDURE — 74011000250 HC RX REV CODE- 250: Performed by: INTERNAL MEDICINE

## 2019-01-26 PROCEDURE — 85027 COMPLETE CBC AUTOMATED: CPT

## 2019-01-26 PROCEDURE — 65660000000 HC RM CCU STEPDOWN

## 2019-01-26 RX ORDER — METOPROLOL TARTRATE 50 MG/1
50 TABLET ORAL EVERY 6 HOURS
Status: DISCONTINUED | OUTPATIENT
Start: 2019-01-26 | End: 2019-01-27

## 2019-01-26 RX ORDER — HYDROXYZINE 25 MG/1
25 TABLET, FILM COATED ORAL
Status: DISCONTINUED | OUTPATIENT
Start: 2019-01-26 | End: 2019-01-29 | Stop reason: HOSPADM

## 2019-01-26 RX ADMIN — SODIUM CHLORIDE 150 ML/HR: 900 INJECTION, SOLUTION INTRAVENOUS at 06:52

## 2019-01-26 RX ADMIN — VANCOMYCIN HYDROCHLORIDE 1000 MG: 1 INJECTION, POWDER, LYOPHILIZED, FOR SOLUTION INTRAVENOUS at 14:18

## 2019-01-26 RX ADMIN — METOPROLOL TARTRATE 25 MG: 25 TABLET ORAL at 00:22

## 2019-01-26 RX ADMIN — FAMOTIDINE 20 MG: 10 INJECTION, SOLUTION INTRAVENOUS at 20:07

## 2019-01-26 RX ADMIN — METOPROLOL TARTRATE 25 MG: 25 TABLET ORAL at 05:48

## 2019-01-26 RX ADMIN — AZTREONAM 2 G: 2 INJECTION, POWDER, LYOPHILIZED, FOR SOLUTION INTRAMUSCULAR; INTRAVENOUS at 14:11

## 2019-01-26 RX ADMIN — AZTREONAM 2 G: 2 INJECTION, POWDER, LYOPHILIZED, FOR SOLUTION INTRAMUSCULAR; INTRAVENOUS at 05:48

## 2019-01-26 RX ADMIN — VANCOMYCIN HYDROCHLORIDE 1000 MG: 1 INJECTION, POWDER, LYOPHILIZED, FOR SOLUTION INTRAVENOUS at 01:33

## 2019-01-26 RX ADMIN — DIPHENHYDRAMINE HYDROCHLORIDE 25 MG: 25 CAPSULE ORAL at 20:07

## 2019-01-26 RX ADMIN — HYDROCORTISONE SODIUM SUCCINATE 100 MG: 100 INJECTION, POWDER, FOR SOLUTION INTRAMUSCULAR; INTRAVENOUS at 05:48

## 2019-01-26 RX ADMIN — PROPYLTHIOURACIL 100 MG: 50 TABLET ORAL at 05:48

## 2019-01-26 RX ADMIN — MENTHOL, CAMPHOR: 1.11; 1.11 LOTION TOPICAL at 18:13

## 2019-01-26 RX ADMIN — Medication 10 ML: at 05:48

## 2019-01-26 RX ADMIN — METOPROLOL TARTRATE 50 MG: 50 TABLET ORAL at 17:37

## 2019-01-26 RX ADMIN — HYDROCORTISONE SODIUM SUCCINATE 100 MG: 100 INJECTION, POWDER, FOR SOLUTION INTRAMUSCULAR; INTRAVENOUS at 14:10

## 2019-01-26 RX ADMIN — DIPHENHYDRAMINE HYDROCHLORIDE 25 MG: 25 CAPSULE ORAL at 01:33

## 2019-01-26 RX ADMIN — HYDROCORTISONE SODIUM SUCCINATE 100 MG: 100 INJECTION, POWDER, FOR SOLUTION INTRAMUSCULAR; INTRAVENOUS at 21:36

## 2019-01-26 RX ADMIN — PROPYLTHIOURACIL 100 MG: 50 TABLET ORAL at 21:37

## 2019-01-26 RX ADMIN — DIPHENHYDRAMINE HYDROCHLORIDE 25 MG: 25 CAPSULE ORAL at 08:52

## 2019-01-26 RX ADMIN — Medication 10 ML: at 21:37

## 2019-01-26 RX ADMIN — FAMOTIDINE 20 MG: 10 INJECTION, SOLUTION INTRAVENOUS at 08:52

## 2019-01-26 RX ADMIN — DIPHENHYDRAMINE HYDROCHLORIDE 25 MG: 25 CAPSULE ORAL at 14:10

## 2019-01-26 RX ADMIN — Medication 10 ML: at 14:15

## 2019-01-26 RX ADMIN — PROPYLTHIOURACIL 100 MG: 50 TABLET ORAL at 14:21

## 2019-01-26 RX ADMIN — METOPROLOL TARTRATE 50 MG: 50 TABLET ORAL at 12:25

## 2019-01-26 RX ADMIN — SODIUM CHLORIDE 150 ML/HR: 900 INJECTION, SOLUTION INTRAVENOUS at 00:17

## 2019-01-26 RX ADMIN — Medication 10 ML: at 08:58

## 2019-01-26 NOTE — PROGRESS NOTES
Bedside and Verbal shift change report given to Juanis Isaac RN (oncoming nurse) by self Carson rader). Report included the following information SBAR, Kardex, ED Summary, Intake/Output, MAR, Recent Results and Cardiac Rhythm ST 120s.

## 2019-01-26 NOTE — PROGRESS NOTES
Bedside and Verbal shift change report given to self (oncoming nurse) by Sylvia Callejas RN (offgoing nurse). Report included the following information SBAR, Kardex, ED Summary, Intake/Output, MAR, Recent Results and Cardiac Rhythm ST 120s. Pt up to the bathroom at shift report.

## 2019-01-26 NOTE — PROGRESS NOTES
Problem: Falls - Risk of 
Goal: *Absence of Falls Document Mar Fearing Fall Risk and appropriate interventions in the flowsheet. Outcome: Progressing Towards Goal 
Fall Risk Interventions: 
  
 
  
 
Medication Interventions: Teach patient to arise slowly Elimination Interventions: Call light in reach, Urinal in reach

## 2019-01-26 NOTE — PROGRESS NOTES
Hospitalist Progress Note 2019 Admit Date: 2019 12:16 PM  
NAME: Leonor Aguilera :  1975 MRN:  [id number] Attending: Phyllis Spencer MD 
PCP:  Deonte Andrea MD 
 
SUBJECTIVE:  
Pt is a 42 yo male recently diagnosed with Graves disease and started on Tapazole and Lopressor about 3 weeks ago. He reports compliance with these meds with mild improvement of symptoms of weight loss, tremors. About 2 days ago pt had sudden flu like symptoms, new full body rash, Tmax 99s, diarrhea, fine tremors extremities, palpitations. In ER pt is noted to meet sepsis criteria with WBC 13, HR 140s, TMax 101.4. Uncertain infection but per sepsis protocol pt started on abx. Additionally started on Solucortef and PTU for possible thyroid dara. Case discussed with Emerita Yu pt's endocrinologist, who feels less likely to be thyroid storm (in light of TSH and free T4 actually improving) and more likely to be reactive from possibly Tapazole or virus. Pt denies CP or SOB but looks quite uncomfortable with palpitations, fine tremors, rash. Interval history (): patient examined at bedside. No acute events overnight. Denies chest pain, shortness of breath. Says he has itching but his rash and his pruritis have improved with Benadryl. Denies heart palpitations or feeling like passing out. Review of Systems negative with exception of pertinent positives noted above PHYSICAL EXAM  
 
Visit Vitals /64 (BP 1 Location: Right arm, BP Patient Position: At rest) Pulse (!) 121 Temp 98 °F (36.7 °C) Resp 18 Ht 5' 9\" (1.753 m) Wt 59.1 kg (130 lb 4.8 oz) SpO2 98% BMI 19.24 kg/m² Temp (24hrs), Av.9 °F (37.2 °C), Min:98 °F (36.7 °C), Max:101.4 °F (38.6 °C) Oxygen Therapy O2 Sat (%): 98 % (19 1253) Pulse via Oximetry: 133 beats per minute (19 1648) O2 Device: Room air (19 1223) Intake/Output Summary (Last 24 hours) at 2019 1444 Last data filed at 1/26/2019 5253 Gross per 24 hour Intake 2701 ml Output 400 ml Net 2301 ml General: No acute distress, pleasant   
Lungs:  CTA Bilaterally without R/R/W Heart:  Tachycardic rate, regular rhythm,  No murmur, rub, or gallop Abdomen: Soft, Non distended, Non tender, Positive bowel sounds Extremities: No cyanosis, clubbing or edema Neurologic:  No focal deficits ASSESSMENT Active Hospital Problems Diagnosis Date Noted  Thyrotoxicosis 01/25/2019 Plan: 
· Increase metoprolol 25 mg to 50 mg q6h · Added hydroxyzine and Sarna for itching · Continue with PTU · Discontinue antibiotics, unlikely underlying infectious etiology · Continue with corticosteroids · Trend LFTs daily F/E/N: discontinue fluids, replete electrolytes as needed, regular diet Ppx: SCDs for VTE Code Status: FULL CODE Disposition: pending clinical improvement. Hopeful for discharge in next 1-2 days. No anticipated post-discharge needs. All questions answered. Signed By: Gemini Guerrero DO   
 January 26, 2019

## 2019-01-26 NOTE — PROGRESS NOTES
Verbal bedside report given to cameron Talbert RN. Patient's situation, background, assessment and recommendations provided. Opportunity for questions provided. Oncoming RN assumed care of patient.

## 2019-01-26 NOTE — PROGRESS NOTES
Problem: Arrhythmia Pathway (Adult) Goal: *Absence of arrhythmia Outcome: Progressing Towards Goal 
Pt having tachycardia. Being treated to try to slow heart rate

## 2019-01-27 PROBLEM — D64.9 NORMOCYTIC ANEMIA, NOT DUE TO BLOOD LOSS: Status: ACTIVE | Noted: 2019-01-27

## 2019-01-27 PROBLEM — R00.0 SINUS TACHYCARDIA: Status: ACTIVE | Noted: 2019-01-27

## 2019-01-27 LAB
ALBUMIN SERPL-MCNC: 2.4 G/DL (ref 3.5–5)
ALBUMIN/GLOB SERPL: 0.8 {RATIO} (ref 1.2–3.5)
ALP SERPL-CCNC: 103 U/L (ref 50–136)
ALT SERPL-CCNC: 33 U/L (ref 12–65)
ANION GAP SERPL CALC-SCNC: 7 MMOL/L (ref 7–16)
AST SERPL-CCNC: 20 U/L (ref 15–37)
BILIRUB DIRECT SERPL-MCNC: <0.1 MG/DL
BILIRUB SERPL-MCNC: 0.3 MG/DL (ref 0.2–1.1)
BUN SERPL-MCNC: 13 MG/DL (ref 6–23)
CALCIUM SERPL-MCNC: 8.6 MG/DL (ref 8.3–10.4)
CHLORIDE SERPL-SCNC: 112 MMOL/L (ref 98–107)
CO2 SERPL-SCNC: 25 MMOL/L (ref 21–32)
CREAT SERPL-MCNC: 0.39 MG/DL (ref 0.8–1.5)
ERYTHROCYTE [DISTWIDTH] IN BLOOD BY AUTOMATED COUNT: 13.4 % (ref 11.9–14.6)
GLOBULIN SER CALC-MCNC: 2.9 G/DL (ref 2.3–3.5)
GLUCOSE SERPL-MCNC: 125 MG/DL (ref 65–100)
HCT VFR BLD AUTO: 24.9 % (ref 41.1–50.3)
HGB BLD-MCNC: 8.4 G/DL (ref 13.6–17.2)
MCH RBC QN AUTO: 29.2 PG (ref 26.1–32.9)
MCHC RBC AUTO-ENTMCNC: 33.7 G/DL (ref 31.4–35)
MCV RBC AUTO: 86.5 FL (ref 79.6–97.8)
NRBC # BLD: 0 K/UL (ref 0–0.2)
PLATELET # BLD AUTO: 169 K/UL (ref 150–450)
PMV BLD AUTO: 10.2 FL (ref 9.4–12.3)
POTASSIUM SERPL-SCNC: 3.3 MMOL/L (ref 3.5–5.1)
PROT SERPL-MCNC: 5.3 G/DL (ref 6.3–8.2)
RBC # BLD AUTO: 2.88 M/UL (ref 4.23–5.6)
SODIUM SERPL-SCNC: 144 MMOL/L (ref 136–145)
WBC # BLD AUTO: 6.1 K/UL (ref 4.3–11.1)

## 2019-01-27 PROCEDURE — 85027 COMPLETE CBC AUTOMATED: CPT

## 2019-01-27 PROCEDURE — 74011250637 HC RX REV CODE- 250/637: Performed by: INTERNAL MEDICINE

## 2019-01-27 PROCEDURE — 80076 HEPATIC FUNCTION PANEL: CPT

## 2019-01-27 PROCEDURE — 80048 BASIC METABOLIC PNL TOTAL CA: CPT

## 2019-01-27 PROCEDURE — 74011000250 HC RX REV CODE- 250: Performed by: INTERNAL MEDICINE

## 2019-01-27 PROCEDURE — 74011250636 HC RX REV CODE- 250/636: Performed by: INTERNAL MEDICINE

## 2019-01-27 PROCEDURE — 36415 COLL VENOUS BLD VENIPUNCTURE: CPT

## 2019-01-27 PROCEDURE — 65660000000 HC RM CCU STEPDOWN

## 2019-01-27 RX ADMIN — HYDROCORTISONE SODIUM SUCCINATE 100 MG: 100 INJECTION, POWDER, FOR SOLUTION INTRAMUSCULAR; INTRAVENOUS at 21:01

## 2019-01-27 RX ADMIN — METOPROLOL TARTRATE 50 MG: 50 TABLET ORAL at 05:57

## 2019-01-27 RX ADMIN — Medication 10 ML: at 21:01

## 2019-01-27 RX ADMIN — DIPHENHYDRAMINE HYDROCHLORIDE 25 MG: 25 CAPSULE ORAL at 14:40

## 2019-01-27 RX ADMIN — METOPROLOL TARTRATE 75 MG: 50 TABLET ORAL at 17:25

## 2019-01-27 RX ADMIN — FAMOTIDINE 20 MG: 10 INJECTION, SOLUTION INTRAVENOUS at 21:01

## 2019-01-27 RX ADMIN — HYDROCORTISONE SODIUM SUCCINATE 100 MG: 100 INJECTION, POWDER, FOR SOLUTION INTRAMUSCULAR; INTRAVENOUS at 05:58

## 2019-01-27 RX ADMIN — PROPYLTHIOURACIL 100 MG: 50 TABLET ORAL at 14:40

## 2019-01-27 RX ADMIN — PROPYLTHIOURACIL 100 MG: 50 TABLET ORAL at 05:57

## 2019-01-27 RX ADMIN — DIPHENHYDRAMINE HYDROCHLORIDE 25 MG: 25 CAPSULE ORAL at 08:17

## 2019-01-27 RX ADMIN — FAMOTIDINE 20 MG: 10 INJECTION, SOLUTION INTRAVENOUS at 08:21

## 2019-01-27 RX ADMIN — METOPROLOL TARTRATE 75 MG: 50 TABLET ORAL at 12:18

## 2019-01-27 RX ADMIN — Medication 10 ML: at 14:41

## 2019-01-27 RX ADMIN — HYDROCORTISONE SODIUM SUCCINATE 100 MG: 100 INJECTION, POWDER, FOR SOLUTION INTRAMUSCULAR; INTRAVENOUS at 14:40

## 2019-01-27 RX ADMIN — Medication 5 ML: at 05:58

## 2019-01-27 RX ADMIN — METOPROLOL TARTRATE 50 MG: 50 TABLET ORAL at 00:33

## 2019-01-27 RX ADMIN — Medication 10 ML: at 08:23

## 2019-01-27 RX ADMIN — DIPHENHYDRAMINE HYDROCHLORIDE 25 MG: 25 CAPSULE ORAL at 02:56

## 2019-01-27 RX ADMIN — DIPHENHYDRAMINE HYDROCHLORIDE 25 MG: 25 CAPSULE ORAL at 21:01

## 2019-01-27 RX ADMIN — PROPYLTHIOURACIL 100 MG: 50 TABLET ORAL at 21:07

## 2019-01-27 NOTE — PROGRESS NOTES
Bedside and Verbal shift change report given to self (oncoming nurse) by Elia Cooper RN (offgoing nurse). Report included the following information SBAR, Kardex, Intake/Output, MAR and Cardiac Rhythm ST.

## 2019-01-27 NOTE — PROGRESS NOTES
Problem: Arrhythmia Pathway (Adult) Goal: *Absence of arrhythmia Outcome: Progressing Towards Goal 
Pt continues to have ST with a rate of 110's

## 2019-01-27 NOTE — PROGRESS NOTES
Bedside and Verbal shift change report given to self (oncoming nurse) by Matilde Arriola RN (offgoing nurse). Report included the following information SBAR, Kardex, Intake/Output, MAR, Recent Results and Cardiac Rhythm ST.

## 2019-01-27 NOTE — PROGRESS NOTES
Problem: Falls - Risk of 
Goal: *Absence of Falls Document Jj Madrid Fall Risk and appropriate interventions in the flowsheet. Outcome: Progressing Towards Goal 
Fall Risk Interventions: 
  
 
  
 
Medication Interventions: Teach patient to arise slowly Elimination Interventions: Call light in reach Up ad zeina without difficulty. No IV fluids running Problem: Arrhythmia Pathway (Adult) Goal: *Absence of arrhythmia Outcome: Progressing Towards Goal 
Pt maintaining Sinus Tach 120s

## 2019-01-27 NOTE — PROGRESS NOTES
Bedside and Verbal shift change report given to [address] (oncoming nurse) by self (offgoing nurse). Report included the following information SBAR, Kardex, Intake/Output, MAR, Recent Results and Cardiac Rhythm ST 110s.

## 2019-01-27 NOTE — PROGRESS NOTES
Hospitalist Progress Note 2019 Admit Date: 2019 12:16 PM  
NAME: Sondra Lozada :  1975 MRN:  [id number] Attending: Radha Muñiz DO 
PCP:  Barby Antunez MD 
 
SUBJECTIVE:  
Pt is a 40 yo male recently diagnosed with Graves disease and started on Tapazole and Lopressor about 3 weeks ago. He reports compliance with these meds with mild improvement of symptoms of weight loss, tremors. About 2 days ago pt had sudden flu like symptoms, new full body rash, Tmax 99s, diarrhea, fine tremors extremities, palpitations. In ER pt is noted to meet sepsis criteria with WBC 13, HR 140s, TMax 101.4. Uncertain infection but per sepsis protocol pt started on abx. Additionally started on Solucortef and PTU for possible thyroid dara. Case discussed with Sintia Gamez, pt's endocrinologist, who feels less likely to be thyroid storm (in light of TSH and free T4 actually improving) and more likely to be reactive from possibly Tapazole or virus. Pt denies CP or SOB but looks quite uncomfortable with palpitations, fine tremors, rash. Interval history (): patient examined at bedside. No acute events overnight. Denies chest pain, shortness of breath. Rash continues to improve overall. No new symptoms today. Denies heart palpitations or feeling like passing out. Review of Systems negative with exception of pertinent positives noted above PHYSICAL EXAM  
 
Visit Vitals /54 (BP 1 Location: Right arm, BP Patient Position: At rest) Pulse (!) 117 Temp 98 °F (36.7 °C) Resp 16 Ht 5' 9\" (1.753 m) Wt 61.9 kg (136 lb 6.4 oz) SpO2 97% BMI 20.14 kg/m² Temp (24hrs), Av.3 °F (36.8 °C), Min:97.8 °F (36.6 °C), Max:99.6 °F (37.6 °C) Oxygen Therapy O2 Sat (%): 97 % (19 1336) Pulse via Oximetry: 133 beats per minute (19 1648) O2 Device: Room air (19 1216) Intake/Output Summary (Last 24 hours) at 2019 1353 Last data filed at 2019 3766 Gross per 24 hour Intake 1300 ml Output 0 ml Net 1300 ml General: No acute distress, pleasant   
Lungs:  CTA Bilaterally without R/R/W Heart:  Tachycardic rate, regular rhythm,  No murmur, rub, or gallop Abdomen: Soft, Non distended, Non tender, Positive bowel sounds Extremities: No cyanosis, clubbing or edema Neurologic:  No focal deficits ASSESSMENT Active Hospital Problems Diagnosis Date Noted  Normocytic anemia, not due to blood loss 01/27/2019  Thyrotoxicosis 01/25/2019 Plan: 
· Increase metoprolol 50 mg to 75 mg q6h · Observed Hgb of 8.4 (dropped from 12), will continue to observe carefully as there is the risk of agranulocytosis, no signs of bleeding · Added hydroxyzine and Sarna for itching · Continue with PTU · Discontinued antibiotics, unlikely underlying infectious etiology · Continue with corticosteroids · Trend LFTs daily F/E/N: no fluids, replete electrolytes as needed, regular diet Ppx: SCDs for VTE Code Status: FULL CODE Disposition: pending clinical improvement. Hopeful for discharge in next 1-2 days. No anticipated post-discharge needs. All questions answered. Signed By: Amber Bradley DO   
 January 27, 2019

## 2019-01-28 LAB
ALBUMIN SERPL-MCNC: 2.5 G/DL (ref 3.5–5)
ALBUMIN/GLOB SERPL: 0.9 {RATIO} (ref 1.2–3.5)
ALP SERPL-CCNC: 114 U/L (ref 50–136)
ALT SERPL-CCNC: 69 U/L (ref 12–65)
ANION GAP SERPL CALC-SCNC: 8 MMOL/L (ref 7–16)
AST SERPL-CCNC: 41 U/L (ref 15–37)
BACTERIA SPEC CULT: NORMAL
BILIRUB SERPL-MCNC: 0.3 MG/DL (ref 0.2–1.1)
BUN SERPL-MCNC: 14 MG/DL (ref 6–23)
CALCIUM SERPL-MCNC: 8.8 MG/DL (ref 8.3–10.4)
CHLORIDE SERPL-SCNC: 110 MMOL/L (ref 98–107)
CO2 SERPL-SCNC: 26 MMOL/L (ref 21–32)
CREAT SERPL-MCNC: 0.37 MG/DL (ref 0.8–1.5)
ERYTHROCYTE [DISTWIDTH] IN BLOOD BY AUTOMATED COUNT: 13.8 % (ref 11.9–14.6)
GLOBULIN SER CALC-MCNC: 2.9 G/DL (ref 2.3–3.5)
GLUCOSE SERPL-MCNC: 113 MG/DL (ref 65–100)
HCT VFR BLD AUTO: 26.3 % (ref 41.1–50.3)
HGB BLD-MCNC: 8.8 G/DL (ref 13.6–17.2)
MCH RBC QN AUTO: 29 PG (ref 26.1–32.9)
MCHC RBC AUTO-ENTMCNC: 33.5 G/DL (ref 31.4–35)
MCV RBC AUTO: 86.8 FL (ref 79.6–97.8)
NRBC # BLD: 0 K/UL (ref 0–0.2)
PLATELET # BLD AUTO: 210 K/UL (ref 150–450)
PMV BLD AUTO: 10 FL (ref 9.4–12.3)
POTASSIUM SERPL-SCNC: 3.1 MMOL/L (ref 3.5–5.1)
PROT SERPL-MCNC: 5.4 G/DL (ref 6.3–8.2)
RBC # BLD AUTO: 3.03 M/UL (ref 4.23–5.6)
SERVICE CMNT-IMP: NORMAL
SODIUM SERPL-SCNC: 144 MMOL/L (ref 136–145)
WBC # BLD AUTO: 7.4 K/UL (ref 4.3–11.1)

## 2019-01-28 PROCEDURE — 74011250637 HC RX REV CODE- 250/637: Performed by: INTERNAL MEDICINE

## 2019-01-28 PROCEDURE — 36415 COLL VENOUS BLD VENIPUNCTURE: CPT

## 2019-01-28 PROCEDURE — 74011000250 HC RX REV CODE- 250: Performed by: INTERNAL MEDICINE

## 2019-01-28 PROCEDURE — 65660000000 HC RM CCU STEPDOWN

## 2019-01-28 PROCEDURE — 80053 COMPREHEN METABOLIC PANEL: CPT

## 2019-01-28 PROCEDURE — 85027 COMPLETE CBC AUTOMATED: CPT

## 2019-01-28 PROCEDURE — 74011250637 HC RX REV CODE- 250/637: Performed by: HOSPITALIST

## 2019-01-28 PROCEDURE — 74011250636 HC RX REV CODE- 250/636: Performed by: HOSPITALIST

## 2019-01-28 PROCEDURE — 74011250636 HC RX REV CODE- 250/636: Performed by: INTERNAL MEDICINE

## 2019-01-28 RX ORDER — METOPROLOL TARTRATE 100 MG/1
100 TABLET ORAL EVERY 6 HOURS
Status: DISCONTINUED | OUTPATIENT
Start: 2019-01-28 | End: 2019-01-29 | Stop reason: HOSPADM

## 2019-01-28 RX ORDER — PREDNISONE 20 MG/1
40 TABLET ORAL
Status: DISCONTINUED | OUTPATIENT
Start: 2019-01-29 | End: 2019-01-29 | Stop reason: HOSPADM

## 2019-01-28 RX ORDER — POTASSIUM CHLORIDE 14.9 MG/ML
20 INJECTION INTRAVENOUS
Status: COMPLETED | OUTPATIENT
Start: 2019-01-28 | End: 2019-01-29

## 2019-01-28 RX ORDER — POTASSIUM CHLORIDE 20 MEQ/1
40 TABLET, EXTENDED RELEASE ORAL 2 TIMES DAILY
Status: COMPLETED | OUTPATIENT
Start: 2019-01-28 | End: 2019-01-29

## 2019-01-28 RX ORDER — POTASSIUM CHLORIDE 20 MEQ/1
40 TABLET, EXTENDED RELEASE ORAL
Status: COMPLETED | OUTPATIENT
Start: 2019-01-28 | End: 2019-01-28

## 2019-01-28 RX ADMIN — PROPYLTHIOURACIL 100 MG: 50 TABLET ORAL at 21:18

## 2019-01-28 RX ADMIN — HYDROCORTISONE SODIUM SUCCINATE 100 MG: 100 INJECTION, POWDER, FOR SOLUTION INTRAMUSCULAR; INTRAVENOUS at 13:48

## 2019-01-28 RX ADMIN — PROPYLTHIOURACIL 100 MG: 50 TABLET ORAL at 05:02

## 2019-01-28 RX ADMIN — METOPROLOL TARTRATE 100 MG: 100 TABLET ORAL at 23:19

## 2019-01-28 RX ADMIN — DIPHENHYDRAMINE HYDROCHLORIDE 25 MG: 25 CAPSULE ORAL at 08:47

## 2019-01-28 RX ADMIN — POTASSIUM CHLORIDE 40 MEQ: 20 TABLET, EXTENDED RELEASE ORAL at 09:58

## 2019-01-28 RX ADMIN — DIPHENHYDRAMINE HYDROCHLORIDE 25 MG: 25 CAPSULE ORAL at 20:09

## 2019-01-28 RX ADMIN — POTASSIUM CHLORIDE 20 MEQ: 200 INJECTION, SOLUTION INTRAVENOUS at 23:19

## 2019-01-28 RX ADMIN — METOPROLOL TARTRATE 100 MG: 100 TABLET ORAL at 17:06

## 2019-01-28 RX ADMIN — METOPROLOL TARTRATE 75 MG: 50 TABLET ORAL at 01:06

## 2019-01-28 RX ADMIN — METOPROLOL TARTRATE 100 MG: 100 TABLET ORAL at 11:22

## 2019-01-28 RX ADMIN — Medication 10 ML: at 13:54

## 2019-01-28 RX ADMIN — METOPROLOL TARTRATE 75 MG: 50 TABLET ORAL at 05:02

## 2019-01-28 RX ADMIN — FAMOTIDINE 20 MG: 10 INJECTION, SOLUTION INTRAVENOUS at 20:08

## 2019-01-28 RX ADMIN — PROPYLTHIOURACIL 100 MG: 50 TABLET ORAL at 13:48

## 2019-01-28 RX ADMIN — Medication 10 ML: at 20:09

## 2019-01-28 RX ADMIN — DIPHENHYDRAMINE HYDROCHLORIDE 25 MG: 25 CAPSULE ORAL at 13:48

## 2019-01-28 RX ADMIN — HYDROCORTISONE SODIUM SUCCINATE 100 MG: 100 INJECTION, POWDER, FOR SOLUTION INTRAMUSCULAR; INTRAVENOUS at 05:02

## 2019-01-28 RX ADMIN — DIPHENHYDRAMINE HYDROCHLORIDE 25 MG: 25 CAPSULE ORAL at 01:06

## 2019-01-28 RX ADMIN — POTASSIUM CHLORIDE 40 MEQ: 20 TABLET, EXTENDED RELEASE ORAL at 21:18

## 2019-01-28 RX ADMIN — POTASSIUM CHLORIDE 20 MEQ: 200 INJECTION, SOLUTION INTRAVENOUS at 21:18

## 2019-01-28 RX ADMIN — FAMOTIDINE 20 MG: 10 INJECTION, SOLUTION INTRAVENOUS at 08:47

## 2019-01-28 RX ADMIN — Medication 10 ML: at 05:02

## 2019-01-28 NOTE — PROGRESS NOTES
Bedside and Verbal shift change report given to self (oncoming nurse) by Lawrence Quarles RN (offgoing nurse). Report included the following information SBAR, Kardex, Intake/Output, MAR and Cardiac Rhythm SR 90s.

## 2019-01-28 NOTE — PROGRESS NOTES
Visit with patient to build rapport with . Patient is calm. Receptive to  presence. Encouraged and assured patient of our continued care. Gurvinder Mendez,  Staff  C: [phone]  /  [email or web address]

## 2019-01-28 NOTE — PROGRESS NOTES
Bedside and Verbal shift change report given to Yesenia Smith RN (oncoming nurse) by self Gaurav Patterson nurse). Report included the following information SBAR, Kardex, ED Summary, Intake/Output, MAR, Recent Results and Cardiac Rhythm NSR-ST.

## 2019-01-28 NOTE — PROGRESS NOTES
Problem: Falls - Risk of 
Goal: *Absence of Falls Document Smitha Ware Fall Risk and appropriate interventions in the flowsheet. Outcome: Progressing Towards Goal 
Fall Risk Interventions: 
  
 
  
 
Medication Interventions: Patient to call before getting OOB Elimination Interventions: Call light in reach

## 2019-01-28 NOTE — PROGRESS NOTES
Hospitalist Progress Note 2019 Admit Date: 2019 12:16 PM  
NAME: Judy Machado :  1975 MRN:  [id number] Attending: Herrera Candelaria DO 
PCP:  Kathryn Acosta MD 
 
SUBJECTIVE:  
Pt is a 40 yo male recently diagnosed with Graves disease and started on Tapazole and Lopressor about 3 weeks ago. He reports compliance with these meds with mild improvement of symptoms of weight loss, tremors. About 2 days ago pt had sudden flu like symptoms, new full body rash, Tmax 99s, diarrhea, fine tremors extremities, palpitations. In ER pt is noted to meet sepsis criteria with WBC 13, HR 140s, TMax 101.4. Uncertain infection but per sepsis protocol pt started on abx. Additionally started on Solucortef and PTU for possible thyroid dara. Case discussed with Gopal Davis, pt's endocrinologist, who feels less likely to be thyroid storm (in light of TSH and free T4 actually improving) and more likely to be reactive from possibly Tapazole or virus. Pt denies CP or SOB but looks quite uncomfortable with palpitations, fine tremors, rash. Interval history (): patient examined at bedside. No acute events overnight. Denies chest pain, shortness of breath. Rash continues to improve overall. No new symptoms today. Denies heart palpitations or feeling like passing out. Review of Systems negative with exception of pertinent positives noted above PHYSICAL EXAM  
 
Visit Vitals /74 Pulse 90 Temp 98 °F (36.7 °C) Resp 18 Ht 5' 9\" (1.753 m) Wt 62.2 kg (137 lb 3.2 oz) SpO2 97% BMI 20.26 kg/m² Temp (24hrs), Av °F (36.7 °C), Min:97.6 °F (36.4 °C), Max:98.7 °F (37.1 °C) Oxygen Therapy O2 Sat (%): 97 % (19 1706) Pulse via Oximetry: 133 beats per minute (19 1648) O2 Device: Room air (19 1805) Intake/Output Summary (Last 24 hours) at 2019 1815 Last data filed at 2019 1252 Gross per 24 hour Intake 860 ml Output 575 ml Net 285 ml General: No acute distress, pleasant   
Lungs:  CTA Bilaterally without R/R/W Heart:  Tachycardic rate, regular rhythm,  No murmur, rub, or gallop Abdomen: Soft, Non distended, Non tender, Positive bowel sounds Extremities: No cyanosis, clubbing or edema Neurologic:  No focal deficits ASSESSMENT Active Hospital Problems Diagnosis Date Noted  Normocytic anemia, not due to blood loss 01/27/2019  Sinus tachycardia 01/27/2019  Thyrotoxicosis 01/25/2019 Plan: 
· Increase metoprolol 75 mg to 100 mg q6h · Observed Hgb of 8.4 (dropped from 12) that has been stable at 8.8, no current concerns for agranulocytosis, continue to observe CBC in the morning (no signs of bleeding) · Added hydroxyzine and Sarna for itching · Continue with PTU · Discontinued antibiotics, unlikely underlying infectious etiology · Continue with corticosteroids, can change · Trend LFTs daily F/E/N: no fluids, replete electrolytes as needed, regular diet Ppx: SCDs for VTE Code Status: FULL CODE Disposition: discharge home tomorrow. Will need outpatient follow-up with endocrinology. No anticipated post-discharge needs. All questions answered. Signed By: Daisy Cantrell DO   
 January 28, 2019

## 2019-01-28 NOTE — PROGRESS NOTES
Bedside and Verbal shift change report given to self (oncoming nurse) by jose eduardo reyes (offgoing nurse). Report included the following information SBAR, Kardex and Recent Results.

## 2019-01-28 NOTE — PROGRESS NOTES
Care Management Interventions PCP Verified by CM: Yes(December 2018) Palliative Care Criteria Met (RRAT>21 & CHF Dx)?: No(RRAT 3 Dx Thyrotoxicosis) Transition of Care Consult (CM Consult): Discharge Planning Discharge Durable Medical Equipment: No(none) Physical Therapy Consult: No 
Occupational Therapy Consult: No 
Speech Therapy Consult: No 
Current Support Network: Lives Alone Confirm Follow Up Transport: Self Plan discussed with Pt/Family/Caregiver: Yes Freedom of Choice Offered: Yes Discharge Location Discharge Placement: Home Met with patient for d/c planning. Patient alert and oriented x 3, independent of ADL's and lives alone. He states he brother has been staying with him recently but this is just temporary. He requires no DME and is able to drive without difficulty. He has [facility] [employer] and is able to obtain his medications at [facility]. PCP is Dr. Michelle William who he saw in December 2018. He voices no concerns or needs for d/c. Discharge plan is home when medically stable.

## 2019-01-28 NOTE — PROGRESS NOTES
Verbal bedside report given to Yaneth Schneider onctyler RN. Patient's situation, background, assessment and recommendations provided. Opportunity for questions provided. Oncoming RN assumed care of patient.

## 2019-01-28 NOTE — PROGRESS NOTES
Problem: Falls - Risk of 
Goal: *Absence of Falls Document Mariangel Chopra Fall Risk and appropriate interventions in the flowsheet. Outcome: Progressing Towards Goal 
Fall Risk Interventions: 
  
 
  
 
Medication Interventions: Teach patient to arise slowly Elimination Interventions: Call light in reach Problem: Arrhythmia Pathway (Adult) Goal: *Absence of arrhythmia Outcome: Progressing Towards Goal 
PT ST 100s-110s

## 2019-01-29 VITALS
BODY MASS INDEX: 20.5 KG/M2 | HEIGHT: 69 IN | DIASTOLIC BLOOD PRESSURE: 60 MMHG | HEART RATE: 94 BPM | RESPIRATION RATE: 16 BRPM | TEMPERATURE: 97.9 F | SYSTOLIC BLOOD PRESSURE: 108 MMHG | WEIGHT: 138.4 LBS | OXYGEN SATURATION: 97 %

## 2019-01-29 LAB
ANION GAP SERPL CALC-SCNC: 7 MMOL/L (ref 7–16)
BUN SERPL-MCNC: 12 MG/DL (ref 6–23)
CALCIUM SERPL-MCNC: 8.4 MG/DL (ref 8.3–10.4)
CHLORIDE SERPL-SCNC: 113 MMOL/L (ref 98–107)
CO2 SERPL-SCNC: 25 MMOL/L (ref 21–32)
CREAT SERPL-MCNC: 0.41 MG/DL (ref 0.8–1.5)
ERYTHROCYTE [DISTWIDTH] IN BLOOD BY AUTOMATED COUNT: 14.3 % (ref 11.9–14.6)
GLUCOSE SERPL-MCNC: 105 MG/DL (ref 65–100)
HCT VFR BLD AUTO: 26.9 % (ref 41.1–50.3)
HGB BLD-MCNC: 9 G/DL (ref 13.6–17.2)
MCH RBC QN AUTO: 29.1 PG (ref 26.1–32.9)
MCHC RBC AUTO-ENTMCNC: 33.5 G/DL (ref 31.4–35)
MCV RBC AUTO: 87.1 FL (ref 79.6–97.8)
NRBC # BLD: 0.06 K/UL (ref 0–0.2)
PLATELET # BLD AUTO: 215 K/UL (ref 150–450)
PMV BLD AUTO: 9.8 FL (ref 9.4–12.3)
POTASSIUM SERPL-SCNC: 3.6 MMOL/L (ref 3.5–5.1)
RBC # BLD AUTO: 3.09 M/UL (ref 4.23–5.6)
SODIUM SERPL-SCNC: 145 MMOL/L (ref 136–145)
WBC # BLD AUTO: 9.2 K/UL (ref 4.3–11.1)

## 2019-01-29 PROCEDURE — 74011250637 HC RX REV CODE- 250/637: Performed by: INTERNAL MEDICINE

## 2019-01-29 PROCEDURE — 90471 IMMUNIZATION ADMIN: CPT

## 2019-01-29 PROCEDURE — 36415 COLL VENOUS BLD VENIPUNCTURE: CPT

## 2019-01-29 PROCEDURE — 80048 BASIC METABOLIC PNL TOTAL CA: CPT

## 2019-01-29 PROCEDURE — 85027 COMPLETE CBC AUTOMATED: CPT

## 2019-01-29 PROCEDURE — 74011000250 HC RX REV CODE- 250: Performed by: INTERNAL MEDICINE

## 2019-01-29 PROCEDURE — 74011250637 HC RX REV CODE- 250/637: Performed by: HOSPITALIST

## 2019-01-29 PROCEDURE — 74011636637 HC RX REV CODE- 636/637: Performed by: INTERNAL MEDICINE

## 2019-01-29 PROCEDURE — 90686 IIV4 VACC NO PRSV 0.5 ML IM: CPT | Performed by: INTERNAL MEDICINE

## 2019-01-29 PROCEDURE — 74011250636 HC RX REV CODE- 250/636: Performed by: INTERNAL MEDICINE

## 2019-01-29 RX ORDER — DIPHENHYDRAMINE HCL 25 MG
25 CAPSULE ORAL
Qty: 20 CAP | Refills: 0 | Status: SHIPPED | OUTPATIENT
Start: 2019-01-29 | End: 2019-02-08

## 2019-01-29 RX ORDER — PREDNISONE 10 MG/1
10 TABLET ORAL
Qty: 20 TAB | Refills: 0 | Status: SHIPPED | OUTPATIENT
Start: 2019-01-29 | End: 2019-01-30 | Stop reason: SDUPTHER

## 2019-01-29 RX ORDER — PROPYLTHIOURACIL 50 MG/1
100 TABLET ORAL EVERY 8 HOURS
Qty: 180 TAB | Refills: 0 | Status: SHIPPED | OUTPATIENT
Start: 2019-01-29 | End: 2019-01-30 | Stop reason: SDUPTHER

## 2019-01-29 RX ORDER — METOPROLOL TARTRATE 100 MG/1
100 TABLET ORAL EVERY 6 HOURS
Qty: 60 TAB | Refills: 0 | Status: SHIPPED | OUTPATIENT
Start: 2019-01-29 | End: 2019-01-30 | Stop reason: SDUPTHER

## 2019-01-29 RX ADMIN — METOPROLOL TARTRATE 100 MG: 100 TABLET ORAL at 11:34

## 2019-01-29 RX ADMIN — METOPROLOL TARTRATE 100 MG: 100 TABLET ORAL at 05:17

## 2019-01-29 RX ADMIN — PREDNISONE 40 MG: 20 TABLET ORAL at 08:33

## 2019-01-29 RX ADMIN — Medication 10 ML: at 05:18

## 2019-01-29 RX ADMIN — FAMOTIDINE 20 MG: 10 INJECTION, SOLUTION INTRAVENOUS at 08:33

## 2019-01-29 RX ADMIN — PROPYLTHIOURACIL 100 MG: 50 TABLET ORAL at 05:16

## 2019-01-29 RX ADMIN — DIPHENHYDRAMINE HYDROCHLORIDE 25 MG: 25 CAPSULE ORAL at 01:20

## 2019-01-29 RX ADMIN — INFLUENZA VIRUS VACCINE 0.5 ML: 15; 15; 15; 15 SUSPENSION INTRAMUSCULAR at 12:47

## 2019-01-29 RX ADMIN — DIPHENHYDRAMINE HYDROCHLORIDE 25 MG: 25 CAPSULE ORAL at 08:32

## 2019-01-29 RX ADMIN — POTASSIUM CHLORIDE 40 MEQ: 20 TABLET, EXTENDED RELEASE ORAL at 08:33

## 2019-01-29 NOTE — DISCHARGE INSTRUCTIONS
Hyperthyroidism: Care Instructions  Your Care Instructions  Hyperthyroidism occurs when the thyroid gland makes too much thyroid hormone. This speeds up your metabolism--how your body uses energy. This condition can cause you to be very active, lose weight, and have sleep problems, eye problems, and a fast heart rate. It can also cause a goiter. A goiter is an enlarged thyroid gland that you can see at the front of the neck. Hyperthyroidism is often caused by Graves' disease. In Graves' disease, the body's defense (immune) system attacks the thyroid gland. Your doctor may prescribe a beta-blocker medicine to slow your pulse and calm you down. But this is not a treatment for hyperthyroidism. It is given for your fast heart rate. Your doctor may also give you antithyroid medicine. This medicine keeps excess thyroid hormone in check. In some cases, doctors recommend radioactive iodine or surgery to remove the thyroid. After either of these treatments, you may need to take medicine to replace thyroid hormone for the rest of your life. Follow-up care is a key part of your treatment and safety. Be sure to make and go to all appointments, and call your doctor if you are having problems. It's also a good idea to know your test results and keep a list of the medicines you take. How can you care for yourself at home? · Take your medicines exactly as prescribed. You need to take the thyroid medicine at the same time each day. Call your doctor if you think you are having a problem with your medicine. · Graves' disease can make your eyes sore. Use artificial tears, eye drops, and sunglasses to protect your eyes from dryness, wind, and sun. Raise your head with pillows at night to prevent your eyes from swelling. In some cases, taping your eyelids shut at night will keep your eyes from being dry in the morning. · Make sure you get enough calcium.  Foods that are rich in calcium include milk, yogurt, cheese, and dark green vegetables. · If you need to gain weight, ask your doctor about special diets. · Do not eat kelp. Kennis Jeans is high in iodine, which can make hyperthyroidism worse. Kennis Jeans is commonly used in [employer] and other [place] foods. You can use iodized salt and eat bread and seafood. Try to eat a balanced diet. · Do not use caffeine and other stimulants. These can make symptoms worse, such as a fast heartbeat, nervousness, and problems focusing. · Do not smoke. Smoking can make your condition worse and may lead to more serious eye problems. If you need help quitting, talk to your doctor about stop-smoking programs and medicines. These can increase your chances of quitting for good. · Lower your stress. Learn to use biofeedback, guided imagery, meditation, or other methods to relax. · Use creams or ointments for irritated skin. Ask your doctor which type to use. · Tell all your doctors about your condition. They need to know because some medicines contain iodine. When should you call for help? Call your doctor now or seek immediate medical care if:    · You have symptoms of a sudden, very high thyroid level (thyroid storm). These include:  ? Being nauseated, vomiting, and having diarrhea. ? Sweating a lot. ? Feeling extremely restless and confused. ? Having a high fever. ? Having a fast heartbeat.     · You have sudden vision changes or eye pain.     · You have a fever or severe sore throat and are taking antithyroid medicines, such as PTU or methimazole.    Watch closely for changes in your health, and be sure to contact your doctor if:    · You have a sore throat or have problems swallowing.     · You have swollen, itchy, or red eyes or your other eye symptoms get worse, or you have new vision problems.     · You have signs of a low thyroid level (hypothyroidism). You may feel very tired, confused, or weak. Where can you learn more? Go to http://vasile-adi.info/.   Enter A410 in the search box to learn more about \"Hyperthyroidism: Care Instructions. \"  Current as of: March 14, 2018  Content Version: 11.9  © 3252-2864 [employer]. Care instructions adapted under license by [employer] (which disclaims liability or warranty for this information). If you have questions about a medical condition or this instruction, always ask your healthcare professional. [address] any warranty or liability for your use of this information. Metoprolol (By mouth)   Metoprolol (met-oh-PROE-lol)  Treats high blood pressure, angina (chest pain), and heart failure. May lower the risk of death after a heart attack. This medicine is a beta-blocker. Brand Name(s): Lopressor, Toprol XL   There may be other brand names for this medicine. When This Medicine Should Not Be Used: This medicine is not right for everyone. Do not use if you had an allergic reaction to metoprolol or similar medicines. Do not use this medicine if you have certain blood circulation or heart problems. Ask your doctor about these problems. How to Use This Medicine:   Tablet, Long Acting Tablet  · Take your medicine as directed. Your dose may need to be changed several times to find what works best for you. · Take this medicine with a meal or right after a meal. Take this medicine the same way every day, at the same time. · Swallow the tablet whole with a glass of water. You may break the extended-release tablet in half, but do not chew or crush it. · Missed dose: Take a dose as soon as you remember. If it is almost time for your next dose, wait until then and take a regular dose. Do not take extra medicine to make up for a missed dose. · Store the medicine in a closed container at room temperature, away from heat, moisture, and direct light.   Drugs and Foods to Avoid:   Ask your doctor or pharmacist before using any other medicine, including over-the-counter medicines, vitamins, and herbal products. · Some medicines can affect how metoprolol works. Tell your doctor if you are taking any of the following:   ¨ Digoxin, dipyridamole, hydralazine, hydroxychloroquine, methyldopa, quinidine  ¨ Medicine to treat depression (such as bupropion, clomipramine, desipramine, fluoxetine, fluvoxamine, paroxetine, sertraline), medicine to treat mental illness (such as chlorpromazine, fluphenazine, haloperidol, thioridazine), medicine for heart rhythm problems (such as propafenone), HIV/AIDS medicine (such as ritonavir), medicine to treat a fungus infection (such as terbinafine), a monoamine oxidase inhibitor (MAOI), an ergot medicine for headaches, a calcium channel blocker (such as amlodipine, diltiazem, verapamil), or an alpha blocker (such as clonidine, prazosin, reserpine, guanethidine)  Warnings While Using This Medicine:   · Tell your doctor if you are pregnant or breastfeeding, or if you have blood vessel, heart, or circulation problems (such as heart failure, rhythm problems, or slow heartbeat). Tell your doctor if you have kidney disease, liver disease, diabetes, lung disease (such as asthma), an overactive thyroid, or a history of allergies. · This medicine may cause worse symptoms of heart failure while the dose is being adjusted. · Do not stop using this medicine suddenly. Your doctor will need to slowly decrease your dose before you stop it completely. · Tell any doctor or dentist who treats you that you are using this medicine. You may need to stop using this medicine several days before you have surgery or medical tests. · This medicine could lower your blood pressure too much, especially when you first use it or if you are dehydrated. Stand or sit up slowly if you feel lightheaded or dizzy. · This medicine may make you dizzy or drowsy. Do not drive or do anything else that could be dangerous until you know how this medicine affects you.   · Your doctor will check your progress and the effects of this medicine at regular visits. Keep all appointments. · Keep all medicine out of the reach of children. Never share your medicine with anyone. Possible Side Effects While Using This Medicine:   Call your doctor right away if you notice any of these side effects:  · Allergic reaction: Itching or hives, swelling in your face or hands, swelling or tingling in your mouth or throat, chest tightness, trouble breathing  · Lightheadedness, dizziness, or fainting  · Slow heartbeat  · Swelling in your hands, ankles, or feet, trouble breathing, tiredness  · Worsening chest pain  If you notice these less serious side effects, talk with your doctor:   · Diarrhea  · Mild dizziness or tiredness  If you notice other side effects that you think are caused by this medicine, tell your doctor. Call your doctor for medical advice about side effects. You may report side effects to FDA at 3-800-FDA-5710  © 2017 [facility] Information is for End User's use only and may not be sold, redistributed or otherwise used for commercial purposes. The above information is an  only. It is not intended as medical advice for individual conditions or treatments. Talk to your doctor, nurse or pharmacist before following any medical regimen to see if it is safe and effective for you. Camphor/Menthol (On the skin)   Camphor (SHARONA-for), Menthol (MEN-thol)  Treats minor aches and pains in muscles and joints due to arthritis, backache, strains, or sprains. Brand Name(s): [employer], Arthricare, Camphotrex, DermaSarra, Freeze It, Freeze It Feet, Freeze It Xtreme, Men-Phor, Mentholatum, Migranow, Sarna, Soltice Quick-Rub, Ziyad, Ziyad Natural Pain Relieving, Sore No More   There may be other brand names for this medicine. When This Medicine Should Not Be Used: You should not use this medicine if you have ever had an allergic reaction to menthol or camphor.    How to Use This Medicine:   Cream, Stick, Lotion, Gel/Jelly, Ointment  · Use this medicine only on your skin. Rinse it off right away if it gets on a cut or scrape. Do not get the medicine in your eyes, nose, or mouth. · Wash your hands with soap and water before and after you use this medicine. · Apply a thin layer of the medicine to the affected area. Rub it in gently. · Do not cover the treated area with a bandage unless directed by your doctor. · Do not use this medicine with a heating pad or device unless your doctor tells you to. How to Store and Dispose of This Medicine:   · Store the medicine in a closed container at room temperature, away from heat, moisture, and direct light. · Ask your pharmacist or doctor how to dispose of the medicine container and any leftover or  medicine. · Keep all medicine away from children. Drugs and Foods to Avoid:   Ask your doctor or pharmacist before using any other medicine, including over-the-counter medicines, vitamins, and herbal products. · Do not put cosmetics or skin care products on the treated skin. Warnings While Using This Medicine:   · Make sure your doctor knows if you are pregnant or breast feeding, or if you have sensitive skin. · Call your doctor if your symptoms do not improve or if they get worse. Possible Side Effects While Using This Medicine:   Call your doctor right away if you notice any of these side effects:  · Skin rash, itching, redness, or swelling. If you notice these less serious side effects, talk with your doctor:   · Return of your symptoms a few days after they have cleared up. If you notice other side effects that you think are caused by this medicine, tell your doctor. Call your doctor for medical advice about side effects. You may report side effects to FDA at 2-669-FDA-2268  © 2017 [facility] Information is for End User's use only and may not be sold, redistributed or otherwise used for commercial purposes.   The above information is an educational aid only. It is not intended as medical advice for individual conditions or treatments. Talk to your doctor, nurse or pharmacist before following any medical regimen to see if it is safe and effective for you. Diphenhydramine (By mouth)   Diphenhydramine (dye-bhumika-MILLIE-sarbjit-meen)  Treats hay fever, allergy, and cold symptoms. Also treats insomnia. Brand Name(s): Aller-G-Time, Allergy, Allergy Medicine, Allergy Relief, Allermax, Anti-Hist, Banophen, Benadryl Allergy, Children's Allergy, Children's Benadryl Allergy, Children's Wal-Dryl Allergy, Complete Allergy, Complete Allergy Medicine, Compoz Nighttime Sleep Aid, Contrast Allergy PreMed Pack   There may be other brand names for this medicine. When This Medicine Should Not Be Used: You should not use this medicine if you have ever had an allergic reaction to diphenhydramine. This medicine should not be given to women who are breast feeding. Do not give any over-the-counter (OTC) cough and cold medicine to a baby or child under 3years old. Using these medicines in very young children might cause serious or possibly life-threatening side effects. How to Use This Medicine:   Capsule, Thin Sheet, Dissolving Tablet, Tablet, Liquid, Liquid Filled Capsule, Chewable Tablet  · Your doctor will tell you how much medicine to use. Do not use more than directed. · Follow the instructions on the medicine label if you are using this medicine without a prescription. · Measure the oral liquid medicine with a marked measuring spoon, oral syringe, or medicine cup. · Swallow the capsule, tablet, and liquid filled capsule whole. Do not crush, break, or chew it. · The chewable tablet must be chewed completely before you swallow it. · Make sure your hands are dry before you handle the disintegrating tablet. Peel back the foil from the blister pack, then remove the tablet. Do not push the tablet through the foil. Place the tablet in your mouth.  After it has melted, swallow or take a drink of water. If a dose is missed:   · Take a dose as soon as you remember. If it is almost time for your next dose, wait until then and take a regular dose. Do not take extra medicine to make up for a missed dose. How to Store and Dispose of This Medicine:   · Store the medicine in a closed container at room temperature, away from heat, moisture, and direct light. Do not freeze the liquid. · Ask your pharmacist, doctor, or health caregiver about the best way to dispose of any outdated medicine or medicine no longer needed. · Keep all medicine out of the reach of children. Never share your medicine with anyone. Drugs and Foods to Avoid:   Ask your doctor or pharmacist before using any other medicine, including over-the-counter medicines, vitamins, and herbal products. · Make sure your doctor knows if you are using an MAO inhibitor (MAOI) such as Eldepryl®, Marplan®, [place], or Parnate®. · Ask your doctor before you use any other products that have diphenhydramine in it. This includes medicines that you use on your skin. · Tell your doctor if you use anything else that makes you sleepy. Some examples are allergy medicine, narcotic pain medicine, and alcohol. · Do not drink alcohol while you are using this medicine. Warnings While Using This Medicine:   · Make sure your doctor knows if you are pregnant or breastfeeding, or if you have an enlarged prostate, or trouble urinating. Tell your doctor if you have glaucoma, or a breathing problem such as emphysema, bronchitis, or asthma. Make sure your doctor knows if you have any other allergies. · This medicine may make you dizzy or drowsy. Avoid driving, using machines, or doing anything else that could be dangerous if you are not alert. · This medicine might contain phenylalanine (aspartame). This is a concern if you have a disorder called phenylketonuria (a problem with amino acids).  If you have this condition, talk to your doctor before using this medicine. Possible Side Effects While Using This Medicine:   Call your doctor right away if you notice any of these side effects:  · Allergic reaction: Itching or hives, swelling in your face or hands, swelling or tingling in your mouth or throat, chest tightness, trouble breathing  · Hallucinations (seeing things that are not really there). · Lightheadedness or fainting. · Pain when urinating, or change in how much or how often you urinate. If you notice these less serious side effects, talk with your doctor:   · Clumsiness. · Constipation, nausea, or stomach upset. · Dry nose, mouth, or throat. · Nervousness or excitability, especially in children. · Upset stomach. · Thick mucus in your nose or throat. If you notice other side effects that you think are caused by this medicine, tell your doctor. Call your doctor for medical advice about side effects. You may report side effects to FDA at 0-556-FDA-6247  © 2017 [facility] Information is for End User's use only and may not be sold, redistributed or otherwise used for commercial purposes. The above information is an  only. It is not intended as medical advice for individual conditions or treatments. Talk to your doctor, nurse or pharmacist before following any medical regimen to see if it is safe and effective for you. Propylthiouracil (By mouth)   Propylthiouracil (wueq-jiw-rwwy-jp-ARN-x-tessie)  Treats Graves' disease and hyperthyroidism (too much thyroid hormone from the thyroid gland) in patients who have already been treated with other medicines (such as methimazole) that did not work well. Brand Name(s):   There may be other brand names for this medicine. When This Medicine Should Not Be Used: You should not use this medicine if you have had an allergic reaction to propylthiouracil, or if you are pregnant or breastfeeding. How to Use This Medicine:   Tablet  · Your doctor will tell you how much medicine to use. Do not use more than directed. · It is best to take propylthiouracil at the same time each day. If you take more than one tablet every day, try to take the medicine at evenly spaced intervals, such as every 12 hours if you take it two times a day, or every 8 hours if you take it three times a day. If a dose is missed:   · Take a dose as soon as you remember. If it is almost time for your next dose, wait until then and take a regular dose. Do not take extra medicine to make up for a missed dose. How to Store and Dispose of This Medicine:   · Store the medicine in a closed container at room temperature, away from heat, moisture, and direct light. · Ask your pharmacist, doctor, or health caregiver about the best way to dispose of any outdated medicine or medicine no longer needed. · Keep all medicine out of the reach of children. Never share your medicine with anyone. Drugs and Foods to Avoid:   Ask your doctor or pharmacist before using any other medicine, including over-the-counter medicines, vitamins, and herbal products. · Make sure your doctor knows if you are also using amiodarone (Cordarone®), certain blood pressure medicine (such as atenolol, metoprolol, propranolol, Inderal®, or Toprol®), potassium supplements, a blood thinner (such as warfarin, Coumadin®), theophylline (Elgin-Dur®), or digoxin (Lanoxin®). · Talk to your doctor before getting any vaccines (such as flu shots). Also, other people living in your home should not get oral polio vaccine while you are using this medicine. There is a chance they could pass on the polio virus to you. Warnings While Using This Medicine:   · It is not safe to take this medicine during pregnancy. It could harm an unborn baby. Tell your doctor right away if you become pregnant. · Make sure your doctor knows if you have liver disease. · It may take several days or weeks for propylthiouracil to start working.  Do not stop using the medicine without talking to your doctor. · This medicine may make you bleed, bruise, or get infections more easily. Take precautions to prevent illness and injury. Wash your hands often. · Liver problems may occur with this medicine. Stop using this medicine and check with your doctor right away if you are having more than one of these symptoms: right upper abdominal or stomach pain or tenderness; jordon-colored stools; dark urine; decreased appetite; fever; headache; itching; loss of appetite; nausea and vomiting; skin rash; swelling of the feet or lower legs; unusual tiredness or weakness; or yellow eyes or skin. · Tell any doctor or dentist who treats you that you are using this medicine. You may need to stop using this medicine several days before you have surgery or medical tests. · Your doctor will check your progress and the effects of this medicine at regular visits. Keep all appointments. Blood tests may be needed to check for unwanted effects. Possible Side Effects While Using This Medicine:   Call your doctor right away if you notice any of these side effects:  · Allergic reaction: Itching or hives, swelling in your face or hands, swelling or tingling in your mouth or throat, chest tightness, trouble breathing  · Changes in menstrual periods. · Cough or sore throat. · Dark-colored urine or pale stools. · Dry, puffy skin. · Mouth sores. · Nausea, vomiting, loss of appetite, or pain in your upper stomach. · Severe fever or chills, or fever that lasts longer than 2 days. · Skin rash or itching. · Swelling in your neck (thyroid). · Unexplained bleeding or bruising. · Unusual weakness. · Yellowing of your skin or the whites of your eyes. If you notice these less serious side effects, talk with your doctor:   · Headache. If you notice other side effects that you think are caused by this medicine, tell your doctor. Call your doctor for medical advice about side effects.  You may report side effects to FDA at 5-928-UXF-4624  © 2017 [facility] Information is for End User's use only and may not be sold, redistributed or otherwise used for commercial purposes. The above information is an  only. It is not intended as medical advice for individual conditions or treatments. Talk to your doctor, nurse or pharmacist before following any medical regimen to see if it is safe and effective for you. Prednisone (By mouth)   Prednisone (PRED-ni-sone)  Treats many diseases and conditions, especially problems related to inflammation. This medicine is a corticosteroid. Brand Name(s): Contrast Allergy PreMed Pack, Alex, predniSONE Intensol   There may be other brand names for this medicine. When This Medicine Should Not Be Used: This medicine is not right for everyone. Do not use if you had an allergic reaction to prednisone or if you are pregnant. How to Use This Medicine:   Liquid, Tablet, Delayed Release Tablet  · Take your medicine as directed. Your dose may need to be changed several times to find what works best for you. · It is best to take this medicine with food or milk. · Swallow the delayed-release tablet whole. Do not crush, break, or chew it. · Measure the oral liquid medicine with a marked measuring spoon, oral syringe, or medicine cup. · Missed dose: Take a dose as soon as you remember. If it is almost time for your next dose, wait until then and take a regular dose. Do not take extra medicine to make up for a missed dose. · Store the medicine in a closed container at room temperature, away from heat, moisture, and direct light. Do not freeze the oral liquid. Drugs and Foods to Avoid:   Ask your doctor or pharmacist before using any other medicine, including over-the-counter medicines, vitamins, and herbal products.   · Tell your doctor if you use any of the following:  ¨ Aminoglutethimide, amphotericin B, carbamazepine, cholestyramine, cyclosporine, digoxin, isoniazid, ketoconazole, phenobarbital, phenytoin, or rifampin  ¨ Blood thinner, such as warfarin  ¨ NSAID pain or arthritis medicine, such as aspirin, diclofenac, ibuprofen, naproxen, celecoxib  ¨ Diuretic (water pill)  ¨ Diabetes medicine  ¨ Macrolide antibiotic, such as azithromycin, clarithromycin, erythromycin  ¨ Estrogen, including birth control pills or hormone replacement therapy  · This medicine may interfere with vaccines. Ask your doctor before you get a flu shot or any other vaccines. Warnings While Using This Medicine:   · It is not safe to take this medicine during pregnancy. It could harm an unborn baby. Tell your doctor right away if you become pregnant. · Tell your doctor if you are breastfeeding or if you have kidney problems, heart failure, high blood pressure, a recent heart attack, diabetes, glaucoma, osteoporosis, or thyroid problems. Tell your doctor about any infection you have. Also tell your doctor if you have had mental or emotional problems (such as depression) or stomach or bowel problems (such as an ulcer or diverticulitis). · This medicine may cause the following problems:  ¨ Mood or behavior changes  ¨ Higher blood pressure, retaining water, changes in salt or potassium levels in your body  ¨ Cataracts or glaucoma (with long-term use)  ¨ Weak bones or osteoporosis (with long-term use)  ¨ Slow growth in children (with long-term use)  ¨ Muscle problems (with high doses, especially if you have myasthenia gravis or similar nerve and muscle problems)  · Do not stop using this medicine suddenly. Your doctor will need to slowly decrease your dose before you stop it completely. · This medicine could cause you to get infections more easily. Tell your doctor right away if you are exposed to chicken pox, measles, or other serious infection. Tell your doctor if you had a serious infection in the past, such as tuberculosis or herpes. · Tell your doctor about any extra stress or anxiety in your life.  Your dose might need to be changed for a short time. · Tell any doctor or dentist who treats you that you are using this medicine. This medicine may affect certain medical test results. · Keep all medicine out of the reach of children. Never share your medicine with anyone. Possible Side Effects While Using This Medicine:   Call your doctor right away if you notice any of these side effects:  · Allergic reaction: Itching or hives, swelling in your face or hands, swelling or tingling in your mouth or throat, chest tightness, trouble breathing  · Dark freckles, skin color changes, coldness, weakness, tiredness, nausea, vomiting, weight loss  · Depression, unusual thoughts, feelings, or behaviors, trouble sleeping  · Fever, chills, cough, sore throat, and body aches  · Muscle pain or weakness  · Rapid weight gain, swelling in your hands, ankles, or feet  · Severe stomach pain, nausea, vomiting, or red or black stools  · Skin changes or growths  · Trouble seeing, eye pain, headache  If you notice these less serious side effects, talk with your doctor:   · Increased appetite  · Round, puffy face  · Weight gain around your neck, upper back, breast, face, or waist  If you notice other side effects that you think are caused by this medicine, tell your doctor. Call your doctor for medical advice about side effects. You may report side effects to FDA at 1-732-FDA-7082  © 2017 [facility] Information is for End User's use only and may not be sold, redistributed or otherwise used for commercial purposes. The above information is an  only. It is not intended as medical advice for individual conditions or treatments. Talk to your doctor, nurse or pharmacist before following any medical regimen to see if it is safe and effective for you.

## 2019-01-29 NOTE — PROGRESS NOTES
Care Management Interventions PCP Verified by CM: Yes(December 2018) Palliative Care Criteria Met (RRAT>21 & CHF Dx)?: No(RRAT 3 Dx Thyrotoxicosis) Mode of Transport at Discharge: Other (see comment)(family) Transition of Care Consult (CM Consult): Discharge Planning Discharge Durable Medical Equipment: No(none) Physical Therapy Consult: No 
Occupational Therapy Consult: No 
Speech Therapy Consult: No 
Current Support Network: Lives Alone Confirm Follow Up Transport: Self Plan discussed with Pt/Family/Caregiver: Yes Freedom of Choice Offered: Yes Discharge Location Discharge Placement: Home Patient ready for d/c home today. He is to follow up with Endocrinology and has a follow up appointment with them. He is d/c home with his brother.

## 2019-01-29 NOTE — PROGRESS NOTES
Bedside and Verbal shift change report given to Autumn Ramos RN (oncoming nurse) by self Cinda rader). Report included the following information SBAR, Intake/Output, MAR, Recent Results and Cardiac Rhythm NSR 90s.

## 2019-01-29 NOTE — PROGRESS NOTES
Bedside and Verbal shift change report given to jose eduardo reyes (oncoming nurse) by self (offgoing nurse). Report included the following information SBAR, Kardex and Recent Results.

## 2019-01-29 NOTE — PROGRESS NOTES
Problem: Falls - Risk of 
Goal: *Absence of Falls Document Arielle Lovell Fall Risk and appropriate interventions in the flowsheet. Outcome: Progressing Towards Goal 
Fall Risk Interventions: 
  
 
  
 
Medication Interventions: Teach patient to arise slowly Elimination Interventions: Call light in reach Problem: Patient Education: Go to Patient Education Activity Goal: Patient/Family Education Outcome: Progressing Towards Goal 
Explained monitoring of K and Hgb to patient. Problem: Arrhythmia Pathway (Adult) Goal: *Absence of arrhythmia Outcome: Progressing Towards Goal 
HR  today.  NSR-ST

## 2019-01-30 LAB
BACTERIA SPEC CULT: NORMAL
BACTERIA SPEC CULT: NORMAL
SERVICE CMNT-IMP: NORMAL
SERVICE CMNT-IMP: NORMAL

## 2019-04-05 PROBLEM — N62 GYNECOMASTIA: Status: RESOLVED | Noted: 2019-01-07 | Resolved: 2019-04-05

## 2019-11-12 PROBLEM — E89.0 POSTSURGICAL HYPOTHYROIDISM: Status: ACTIVE | Noted: 2019-11-12

## 2019-11-12 PROBLEM — R00.2 PALPITATIONS: Status: RESOLVED | Noted: 2019-01-07 | Resolved: 2019-11-12

## 2020-06-23 ENCOUNTER — HOSPITAL ENCOUNTER (OUTPATIENT)
Dept: LAB | Age: 45
Discharge: HOME | End: 2020-06-23
Payer: COMMERCIAL

## 2020-06-23 DIAGNOSIS — E89.0 POSTSURGICAL HYPOTHYROIDISM: ICD-10-CM

## 2020-06-23 LAB
T4 FREE SERPL-MCNC: 1 NG/DL (ref 0.78–1.46)
TSH W FREE THYROID I,TSHELE: 15.4 UIU/ML (ref 0.36–3.74)

## 2020-06-23 PROCEDURE — 36415 COLL VENOUS BLD VENIPUNCTURE: CPT

## 2020-06-23 PROCEDURE — 84439 ASSAY OF FREE THYROXINE: CPT

## 2020-06-23 PROCEDURE — 84443 ASSAY THYROID STIM HORMONE: CPT

## 2020-08-06 ENCOUNTER — HOSPITAL ENCOUNTER (OUTPATIENT)
Dept: LAB | Age: 45
Discharge: HOME | End: 2020-08-06
Payer: COMMERCIAL

## 2020-08-06 LAB
T4 FREE SERPL-MCNC: 1.3 NG/DL (ref 0.78–1.46)
TSH W FREE THYROID I,TSHELE: 5.37 UIU/ML (ref 0.36–3.74)

## 2020-08-06 PROCEDURE — 36415 COLL VENOUS BLD VENIPUNCTURE: CPT

## 2020-08-06 PROCEDURE — 84443 ASSAY THYROID STIM HORMONE: CPT

## 2020-08-06 PROCEDURE — 84439 ASSAY OF FREE THYROXINE: CPT

## 2020-08-10 PROBLEM — E05.00 GRAVES' OPHTHALMOPATHY: Status: ACTIVE | Noted: 2020-08-10

## 2020-11-10 ENCOUNTER — HOSPITAL ENCOUNTER (OUTPATIENT)
Dept: LAB | Age: 45
Discharge: HOME | End: 2020-11-10
Payer: COMMERCIAL

## 2020-11-10 DIAGNOSIS — E89.0 POSTSURGICAL HYPOTHYROIDISM: ICD-10-CM

## 2020-11-10 LAB — TSH W FREE THYROID I,TSHELE: 2.02 UIU/ML (ref 0.36–3.74)

## 2020-11-10 PROCEDURE — 84443 ASSAY THYROID STIM HORMONE: CPT

## 2020-11-10 PROCEDURE — 36415 COLL VENOUS BLD VENIPUNCTURE: CPT

## 2021-03-11 ENCOUNTER — HOSPITAL ENCOUNTER (OUTPATIENT)
Dept: LAB | Age: 46
Discharge: HOME | End: 2021-03-11
Payer: COMMERCIAL

## 2021-03-11 DIAGNOSIS — E89.0 POSTSURGICAL HYPOTHYROIDISM: ICD-10-CM

## 2021-03-11 LAB — TSH W FREE THYROID I,TSHELE: 2.92 UIU/ML (ref 0.36–3.74)

## 2021-03-11 PROCEDURE — 36415 COLL VENOUS BLD VENIPUNCTURE: CPT

## 2021-03-11 PROCEDURE — 84443 ASSAY THYROID STIM HORMONE: CPT

## 2022-03-18 PROBLEM — D64.9 NORMOCYTIC ANEMIA, NOT DUE TO BLOOD LOSS: Status: ACTIVE | Noted: 2019-01-27

## 2022-03-18 PROBLEM — E05.00 GRAVES' DISEASE: Status: ACTIVE | Noted: 2019-01-07

## 2022-03-19 PROBLEM — E05.00 GRAVES' OPHTHALMOPATHY: Status: ACTIVE | Noted: 2020-08-10

## 2022-03-19 PROBLEM — E89.0 POSTSURGICAL HYPOTHYROIDISM: Status: ACTIVE | Noted: 2019-11-12

## 2022-05-03 PROBLEM — E07.9 THYROID EYE DISEASE: Status: ACTIVE | Noted: 2020-08-10

## 2022-05-03 PROBLEM — H57.89 THYROID EYE DISEASE: Status: ACTIVE | Noted: 2020-08-10

## 2022-06-14 DIAGNOSIS — E89.0 POSTSURGICAL HYPOTHYROIDISM: Primary | ICD-10-CM

## 2022-10-06 DIAGNOSIS — E89.0 POSTSURGICAL HYPOTHYROIDISM: ICD-10-CM

## 2022-10-06 LAB — TSH W FREE THYROID IF ABNORMAL: 1.25 UIU/ML (ref 0.36–3.74)

## 2022-10-10 ENCOUNTER — OFFICE VISIT (OUTPATIENT)
Dept: ENDOCRINOLOGY | Age: 47
End: 2022-10-10
Payer: COMMERCIAL

## 2022-10-10 VITALS
HEART RATE: 71 BPM | SYSTOLIC BLOOD PRESSURE: 122 MMHG | WEIGHT: 171 LBS | HEIGHT: 69 IN | OXYGEN SATURATION: 98 % | BODY MASS INDEX: 25.33 KG/M2 | DIASTOLIC BLOOD PRESSURE: 86 MMHG

## 2022-10-10 DIAGNOSIS — E07.9 THYROID EYE DISEASE: ICD-10-CM

## 2022-10-10 DIAGNOSIS — H57.89 THYROID EYE DISEASE: ICD-10-CM

## 2022-10-10 DIAGNOSIS — E89.0 POSTSURGICAL HYPOTHYROIDISM: Primary | ICD-10-CM

## 2022-10-10 PROCEDURE — 99214 OFFICE O/P EST MOD 30 MIN: CPT | Performed by: INTERNAL MEDICINE

## 2022-10-10 RX ORDER — LEVOTHYROXINE SODIUM 125 UG/1
TABLET ORAL
COMMUNITY
Start: 2022-07-28 | End: 2022-10-10 | Stop reason: SDUPTHER

## 2022-10-10 RX ORDER — LEVOTHYROXINE SODIUM 125 UG/1
125 TABLET ORAL DAILY
Qty: 90 TABLET | Refills: 3 | Status: SHIPPED | OUTPATIENT
Start: 2022-10-10

## 2022-10-10 ASSESSMENT — ENCOUNTER SYMPTOMS
DIARRHEA: 0
CONSTIPATION: 0
ROS SKIN COMMENTS: DENIES HAIR LOSS, DRY SKIN.

## 2022-10-10 NOTE — PROGRESS NOTES
Sotero Veloz MD, [facility]  [address], [address] [facility], [address] [place] [address]  Phone [phone]  Facsimile [phone]          Mikaela Cintron is a 41 y.o. male seen 10/10/2022 for follow-up of hypothyroidism        ASSESSMENT AND PLAN:    1. Postsurgical hypothyroidism  He is clinically and biochemically euthyroid. Follow up in 6 months. - LEVOXYL 125 MCG tablet; Take 1 tablet by mouth Daily  Dispense: 90 tablet; Refill: 3    2. Thyroid eye disease  Followed by Dr. Clive Greco. Status post oral glucocorticoid therapy, orbital radiation and Tepezza infusions.      - Selenium 200 MCG CAPS; Take 1 capsule by mouth daily  Dispense: 1 capsule; Refill: 0       Follow-up and Dispositions    Return in about 6 months (around 4/10/2023). HISTORY OF PRESENT ILLNESS:    THYROID DISEASE     Presentation/Diagnosis: Graves' disease status post total thyroidectomy 10/18/2019. Symptoms: See review of systems. 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 He is status post prednisone and orbital radiation (completed 2/2021). He is status post [facility] infusions (completed 10/2021). His periorbital swelling improved. He still has occasional tearing. Diplopia resolved. Treatment: Takes name brand in AM correctly. Labs:  11/8/2019: TSH 0.050, free T4 1.10, free T3 2.2, AST 33, ALT 51, alkaline phosphatase 123.  1/15/2020: TSH 3.610.  6/23/2020: TSH 15.40, free T4 1.0.  8/6/2020: TSH 5.37, free T4 1.3.  11/10/2020: TSH 2.02.   3/11/2021: TSH 2.92.  9/13/2021: TSH 0.016, free T4 2.35.  1/6/2022: TSH 0.226, free T4 2. 19.  4/28/2022: TSH 0.144, free T4 2. 11.  8/15/2022: Thyroid-stimulating immunoglobulins 1.73.  10/6/2022: TSH 1.25. Review of Systems   Constitutional:  Negative for diaphoresis, fatigue and unexpected weight change. Cardiovascular:  Negative for palpitations.    Gastrointestinal:  Negative for constipation and diarrhea. Endocrine: Negative for cold intolerance and heat intolerance. Skin:         Denies hair loss, dry skin. Neurological:  Negative for tremors. Vital Signs:  /86   Pulse 71   Ht 5' 9\" (1.753 m)   Wt 171 lb (77.6 kg)   SpO2 98%   BMI 25.25 kg/m²     Wt Readings from Last 3 Encounters:   10/10/22 171 lb (77.6 kg)   05/03/22 171 lb (77.6 kg)   01/11/22 180 lb (81.6 kg)       Physical Exam  Constitutional:       General: He is not in acute distress. Eyes:      Comments: No proptosis. Mild right upper lid retraction. Neck:      Comments: Thyroidectomy scar. Cardiovascular:      Rate and Rhythm: Normal rate and regular rhythm. Lymphadenopathy:      Cervical: No cervical adenopathy. Neurological:      Motor: No tremor. Orders Placed This Encounter   Procedures    TSH with Reflex     Standing Status:   Future     Standing Expiration Date:   10/10/2023       Current Outpatient Medications   Medication Sig Dispense Refill    Multiple Vitamin (MULTIVITAMIN PO) Take by mouth      LEVOXYL 125 MCG tablet Take 1 tablet by mouth Daily 90 tablet 3    Selenium 200 MCG CAPS Take 1 capsule by mouth daily 1 capsule 0    Cetirizine HCl 10 MG CAPS Take 1 tablet by mouth daily       No current facility-administered medications for this visit.

## 2023-04-07 DIAGNOSIS — E89.0 POSTSURGICAL HYPOTHYROIDISM: ICD-10-CM

## 2023-04-07 LAB — TSH W FREE THYROID IF ABNORMAL: 3.64 UIU/ML (ref 0.36–3.74)

## 2023-10-06 DIAGNOSIS — E89.0 POSTSURGICAL HYPOTHYROIDISM: ICD-10-CM

## 2023-10-06 LAB — TSH W FREE THYROID IF ABNORMAL: 3.31 UIU/ML (ref 0.36–3.74)

## 2023-10-11 ENCOUNTER — OFFICE VISIT (OUTPATIENT)
Dept: ENDOCRINOLOGY | Age: 48
End: 2023-10-11
Payer: COMMERCIAL

## 2023-10-11 VITALS
SYSTOLIC BLOOD PRESSURE: 98 MMHG | OXYGEN SATURATION: 98 % | BODY MASS INDEX: 26.43 KG/M2 | HEART RATE: 89 BPM | WEIGHT: 179 LBS | DIASTOLIC BLOOD PRESSURE: 78 MMHG

## 2023-10-11 DIAGNOSIS — E89.0 POSTSURGICAL HYPOTHYROIDISM: Primary | ICD-10-CM

## 2023-10-11 DIAGNOSIS — E07.9 THYROID EYE DISEASE: ICD-10-CM

## 2023-10-11 DIAGNOSIS — H57.89 THYROID EYE DISEASE: ICD-10-CM

## 2023-10-11 PROCEDURE — 99214 OFFICE O/P EST MOD 30 MIN: CPT | Performed by: INTERNAL MEDICINE

## 2023-10-11 RX ORDER — LEVOTHYROXINE SODIUM 125 UG/1
125 TABLET ORAL DAILY
Qty: 90 TABLET | Refills: 3 | Status: SHIPPED | OUTPATIENT
Start: 2023-10-11

## 2023-10-11 ASSESSMENT — ENCOUNTER SYMPTOMS
ROS SKIN COMMENTS: DENIES HAIR LOSS, DRY SKIN.
CONSTIPATION: 0
DIARRHEA: 0

## 2024-06-22 ENCOUNTER — TRANSCRIBE ORDERS (OUTPATIENT)
Dept: SCHEDULING | Age: 49
End: 2024-06-22

## 2024-06-22 DIAGNOSIS — M54.6 PAIN IN THORACIC SPINE: Primary | ICD-10-CM

## 2024-10-10 DIAGNOSIS — E89.0 POSTSURGICAL HYPOTHYROIDISM: ICD-10-CM

## 2024-10-10 LAB — TSH W FREE THYROID IF ABNORMAL: 3.13 UIU/ML (ref 0.27–4.2)

## 2024-10-14 ENCOUNTER — OFFICE VISIT (OUTPATIENT)
Dept: ENDOCRINOLOGY | Age: 49
End: 2024-10-14
Payer: COMMERCIAL

## 2024-10-14 VITALS
WEIGHT: 179 LBS | RESPIRATION RATE: 14 BRPM | HEIGHT: 69 IN | DIASTOLIC BLOOD PRESSURE: 64 MMHG | SYSTOLIC BLOOD PRESSURE: 106 MMHG | OXYGEN SATURATION: 98 % | BODY MASS INDEX: 26.51 KG/M2 | HEART RATE: 76 BPM

## 2024-10-14 DIAGNOSIS — E07.9 THYROID EYE DISEASE: ICD-10-CM

## 2024-10-14 DIAGNOSIS — H57.89 THYROID EYE DISEASE: ICD-10-CM

## 2024-10-14 DIAGNOSIS — E89.0 POSTSURGICAL HYPOTHYROIDISM: Primary | ICD-10-CM

## 2024-10-14 PROCEDURE — 99214 OFFICE O/P EST MOD 30 MIN: CPT | Performed by: INTERNAL MEDICINE

## 2024-10-14 RX ORDER — CYANOCOBALAMIN (VITAMIN B-12) 1000 MCG
1 TABLET, EXTENDED RELEASE ORAL DAILY
Qty: 1 CAPSULE | Refills: 0
Start: 2024-10-14

## 2024-10-14 RX ORDER — LEVOTHYROXINE SODIUM 125 UG/1
125 TABLET ORAL DAILY
Qty: 90 TABLET | Refills: 3 | Status: SHIPPED | OUTPATIENT
Start: 2024-10-14

## 2024-10-14 ASSESSMENT — ENCOUNTER SYMPTOMS
DIARRHEA: 0
ROS SKIN COMMENTS: DENIES HAIR LOSS, DRY SKIN.
CONSTIPATION: 0

## 2024-10-14 NOTE — PROGRESS NOTES
Sotero Borja MD, FACE  [facility]  [address] [place] [address], [facility] [address]  [place] [address]  Phone [phone]  Facsimile [phone]          Ramakrishna Golden is a 49 y.o. male seen 10/14/2024 for follow-up of hypothyroidism        ASSESSMENT AND PLAN:    1. Postsurgical hypothyroidism  He is clinically and biochemically euthyroid.  Follow up in 1 year.    - LEVOXYL 125 MCG tablet; Take 1 tablet by mouth Daily  Dispense: 90 tablet; Refill: 3    2. Thyroid eye disease  Followed by Dr. Bowers.  Status post oral glucocorticoid therapy, orbital radiation and Tepezza infusions.      - Selenium 200 MCG CAPS; Take 1 capsule by mouth daily  Dispense: 1 capsule; Refill: 0       Follow-up and Dispositions    Return in about 1 year (around 10/14/2025).         HISTORY OF PRESENT ILLNESS:    THYROID DISEASE     Presentation/Diagnosis: Graves' disease status post total thyroidectomy 10/18/2019.     Symptoms: See review of systems.       Associated Conditions: Graves' ophthalmopathy followed by Dr. Toure.  He is status post prednisone and orbital radiation (completed 2/2021).  He is status post Tepezza infusions (completed 10/2021).  His periorbital swelling improved.  He still has occasional tearing.  Diplopia resolved.     Treatment: Takes name brand in AM correctly.     Labs:  11/8/2019: TSH 0.050, free T4 1.10, free T3 2.2, AST 33, ALT 51, alkaline phosphatase 123.  1/15/2020: TSH 3.610.  6/23/2020: TSH 15.40, free T4 1.0.  8/6/2020: TSH 5.37, free T4 1.3.  11/10/2020: TSH 2.02.   3/11/2021: TSH 2.92.  9/13/2021: TSH 0.016, free T4 2.35.  1/6/2022: TSH 0.226, free T4 2.19.  4/28/2022: TSH 0.144, free T4 2.11.  8/15/2022: Thyroid-stimulating immunoglobulins 1.73.  10/6/2022: TSH 1.25.  4/7/2023: TSH 3.64.  10/6/2023: TSH 3.31.  10/10/2024: TSH 3.13.      Review of Systems   Constitutional:  Negative for diaphoresis, fatigue and unexpected weight change.   Cardiovascular:  Negative for